# Patient Record
Sex: FEMALE | Race: WHITE
[De-identification: names, ages, dates, MRNs, and addresses within clinical notes are randomized per-mention and may not be internally consistent; named-entity substitution may affect disease eponyms.]

---

## 2020-03-08 ENCOUNTER — HOSPITAL ENCOUNTER (EMERGENCY)
Dept: HOSPITAL 41 - JD.ED | Age: 25
Discharge: HOME | End: 2020-03-08
Payer: MEDICAID

## 2020-03-08 DIAGNOSIS — Z88.5: ICD-10-CM

## 2020-03-08 DIAGNOSIS — Z88.0: ICD-10-CM

## 2020-03-08 DIAGNOSIS — Z88.8: ICD-10-CM

## 2020-03-08 DIAGNOSIS — Z91.041: ICD-10-CM

## 2020-03-08 DIAGNOSIS — J06.9: Primary | ICD-10-CM

## 2020-03-08 DIAGNOSIS — F17.210: ICD-10-CM

## 2020-03-08 DIAGNOSIS — J45.909: ICD-10-CM

## 2020-03-08 NOTE — EDM.PDOC
ED HPI GENERAL MEDICAL PROBLEM





- General


Chief Complaint: Respiratory Problem


Stated Complaint: EXPOSED TO FLU HAS COUGH AND KIDNEY PAIN


Time Seen by Provider: 03/08/20 11:29


Source of Information: Reports: Patient, Family


History Limitations: Reports: No Limitations





- History of Present Illness


INITIAL COMMENTS - FREE TEXT/NARRATIVE: 





The patient presents with a fever, cough, congestion, runny nose, and kidney 

pain.  She has a history or renal insufficiency from prior drug use.  She also 

has a sore throat.  She has no chest pain or shortness of breath.  She has a 

little nausea but no vomiting and she has no abdominal pain.  She does not have 

dysuria or hematuria.


Onset: Gradual


Duration: Day(s):


Location: Reports: Back, Other (throat)


Quality: Reports: Sharp


Severity: Moderate


Improves with: Reports: None


Worsens with: Reports: None


Associated Symptoms: Reports: Cough, Fever/Chills.  Denies: Chest Pain, 

Headaches, Nausea/Vomiting, Shortness of Breath





- Related Data


 Allergies











Allergy/AdvReac Type Severity Reaction Status Date / Time


 


butorphanol [From Stadol] Allergy  Cannot Verified 03/08/20 11:29





   Remember  


 


ketorolac [From Toradol] Allergy  Cannot Verified 03/08/20 11:29





   Remember  


 


Penicillins Allergy  Cannot Verified 03/08/20 11:29





   Remember  


 


trazodone Allergy  Cannot Verified 03/08/20 11:29





   Remember  


 


contrast dye. Allergy  Cannot Uncoded 03/08/20 11:29





   Remember  











Home Meds: 


 Home Meds





Codeine/Promethazine [Phenergan with Codeine] 5 - 10 ml PO Q6HR PRN #300 ml 03/ 08/20 [Rx]











Past Medical History


Cardiovascular History: Reports: Other (See Below)


Other Cardiovascular History: tachycardia


Respiratory History: Reports: Asthma





- Past Surgical History


HEENT Surgical History: Reports: Adenoidectomy, Myringotomy w Tube(s), 

Tonsillectomy


GI Surgical History: Reports: Cholecystectomy





Social & Family History





- Tobacco Use


Smoking Status *Q: Current Every Day Smoker


Years of Tobacco use: 12


Packs/Tins Daily: 1





- Recreational Drug Use


Recreational Drug Use: No





ED ROS GENERAL





- Review of Systems


Review Of Systems: See Below


Constitutional: Reports: No Symptoms


HEENT: Reports: Throat Pain


Respiratory: Reports: Cough.  Denies: Shortness of Breath


Cardiovascular: Reports: No Symptoms


Endocrine: Reports: No Symptoms


GI/Abdominal: Reports: Nausea.  Denies: Abdominal Pain, Vomiting


: Reports: No Symptoms


Musculoskeletal: Reports: Back Pain





ED EXAM, GENERAL





- Physical Exam


Exam: See Below


Exam Limited By: No Limitations


General Appearance: Alert, No Apparent Distress


Ears: Normal External Exam


Nose: Normal Inspection


Throat/Mouth: Other (pharyngeal erythema and some mild edema)


Head: Atraumatic, Normocephalic


Neck: Normal Inspection, Supple, Non-Tender


Respiratory/Chest: No Respiratory Distress, Lungs Clear, Normal Breath Sounds


Cardiovascular: Regular Rate, Rhythm, No Edema, No Murmur


GI/Abdominal: Soft, Non-Tender, No Organomegaly, No Mass


Back Exam: CVA Tenderness (L), CVA Tenderness (R)


Extremities: Normal Inspection


Neurological: Alert, Oriented, No Motor/Sensory Deficits





Course





- Vital Signs


Last Recorded V/S: 


 Last Vital Signs











Temp  97.1 F   03/08/20 11:26


 


Pulse  112 H  03/08/20 11:26


 


Resp  16   03/08/20 11:26


 


BP  130/102 H  03/08/20 11:26


 


Pulse Ox  98   03/08/20 11:26














- Orders/Labs/Meds


Orders: 


 Active Orders 24 hr











 Category Date Time Status


 


 CBC WITH AUTO DIFF [HEME] Stat Lab  03/08/20 12:20 Results


 


 CULTURE STREP A CONFIRMATION [RM] Stat Lab  03/08/20 11:29 Results


 


 STREP SCRN A RAPID W CULT CONF [RM] Stat Lab  03/08/20 11:29 Results











Labs: 


 Laboratory Tests











  03/08/20 03/08/20 Range/Units





  12:20 12:20 


 


WBC  3.85 L   (3.98-10.04)  K/mm3


 


RBC  4.79   (3.98-5.22)  M/mm3


 


Hgb  13.4   (11.2-15.7)  gm/dl


 


Hct  41.5   (34.1-44.9)  %


 


MCV  86.6   (79.4-94.8)  fl


 


MCH  28.0   (25.6-32.2)  pg


 


MCHC  32.3   (32.2-35.5)  g/dl


 


RDW Std Deviation  45.4   (36.4-46.3)  fL


 


Plt Count  280   (182-369)  K/mm3


 


MPV  10.0   (9.4-12.3)  fl


 


Neut % (Auto)  64.0   (34.0-71.1)  %


 


Lymph % (Auto)  17.9 L   (19.3-51.7)  %


 


Mono % (Auto)  16.6 H   (4.7-12.5)  %


 


Eos % (Auto)  1.0   (0.7-5.8)  


 


Baso % (Auto)  0.5   (0.1-1.2)  %


 


Neut # (Auto)  2.46   (1.56-6.13)  K/mm3


 


Lymph # (Auto)  0.69 L   (1.18-3.74)  K/mm3


 


Mono # (Auto)  0.64 H   (0.24-0.36)  K/mm3


 


Eos # (Auto)  0.04   (0.04-0.36)  K/mm3


 


Baso # (Auto)  0.02   (0.01-0.08)  K/mm3


 


Sodium   139  (136-145)  mEq/L


 


Potassium   3.9  (3.5-5.1)  mEq/L


 


Chloride   106  ()  mEq/L


 


Carbon Dioxide   26  (21-32)  mEq/L


 


Anion Gap   10.9  (5-15)  


 


BUN   7  (7-18)  mg/dL


 


Creatinine   0.7  (0.55-1.02)  mg/dL


 


Est Cr Clr Drug Dosing   103.40  mL/min


 


Estimated GFR (MDRD)   > 60  (>60)  mL/min


 


BUN/Creatinine Ratio   10.0 L  (14-18)  


 


Glucose   85  ()  mg/dL


 


Calcium   9.0  (8.5-10.1)  mg/dL


 


Total Bilirubin   0.2  (0.2-1.0)  mg/dL


 


AST   18  (15-37)  U/L


 


ALT   28  (14-59)  U/L


 


Alkaline Phosphatase   115  ()  U/L


 


Total Protein   7.5  (6.4-8.2)  g/dl


 


Albumin   3.7  (3.4-5.0)  g/dl


 


Globulin   3.8  gm/dL


 


Albumin/Globulin Ratio   1.0  (1-2)  














- Re-Assessments/Exams


Free Text/Narrative Re-Assessment/Exam: 





03/08/20 12:03


I have ordered some labs, influenza and strep.


03/08/20 12:54


The infleunza and strep are negative.  Her CBC and CMP look good.  She has a 

viral URI.  I will give her something for the cough.





Departure





- Departure


Time of Disposition: 13:00


Disposition: Home, Self-Care 01


Condition: Good


Clinical Impression: 


 Viral URI with cough








- Discharge Information


*PRESCRIPTION DRUG MONITORING PROGRAM REVIEWED*: Not Applicable


*COPY OF PRESCRIPTION DRUG MONITORING REPORT IN PATIENT DARIUS: Not Applicable


Prescriptions: 


Codeine/Promethazine [Phenergan with Codeine] 5 - 10 ml PO Q6HR PRN #300 ml


 PRN Reason: Cough


Referrals: 


PCP,None [Primary Care Provider] - 


Forms:  ED Department Discharge


Additional Instructions: 


Drink plenty of fluids.  Take motrin or tylenol as needed for fever.  Use the 

phenergan with codeine as needed for cough.  Please return if you are worse.





Sepsis Event Note





- Evaluation


Sepsis Screening Result: No Definite Risk





- Focused Exam


Vital Signs: 


 Vital Signs











  Temp Pulse Resp BP Pulse Ox


 


 03/08/20 11:26  97.1 F  112 H  16  130/102 H  98











Date Exam was Performed: 03/08/20


Time Exam was Performed: 12:54





- My Orders


Last 24 Hours: 


My Active Orders





03/08/20 11:29


CULTURE STREP A CONFIRMATION [RM] Stat 


STREP SCRN A RAPID W CULT CONF [RM] Stat 





03/08/20 12:20


CBC WITH AUTO DIFF [HEME] Stat 














- Assessment/Plan


Last 24 Hours: 


My Active Orders





03/08/20 11:29


CULTURE STREP A CONFIRMATION [RM] Stat 


STREP SCRN A RAPID W CULT CONF [RM] Stat 





03/08/20 12:20


CBC WITH AUTO DIFF [HEME] Stat

## 2020-03-11 ENCOUNTER — HOSPITAL ENCOUNTER (EMERGENCY)
Dept: HOSPITAL 41 - JD.ED | Age: 25
Discharge: HOME | End: 2020-03-11
Payer: SELF-PAY

## 2020-03-11 DIAGNOSIS — J45.909: ICD-10-CM

## 2020-03-11 DIAGNOSIS — Z88.0: ICD-10-CM

## 2020-03-11 DIAGNOSIS — F17.210: ICD-10-CM

## 2020-03-11 DIAGNOSIS — Z88.8: ICD-10-CM

## 2020-03-11 DIAGNOSIS — J06.9: Primary | ICD-10-CM

## 2020-03-11 DIAGNOSIS — Z91.041: ICD-10-CM

## 2020-03-11 PROCEDURE — 94640 AIRWAY INHALATION TREATMENT: CPT

## 2020-03-11 PROCEDURE — 96361 HYDRATE IV INFUSION ADD-ON: CPT

## 2020-03-11 PROCEDURE — 99283 EMERGENCY DEPT VISIT LOW MDM: CPT

## 2020-03-11 PROCEDURE — 96374 THER/PROPH/DIAG INJ IV PUSH: CPT

## 2020-03-11 RX ADMIN — KETOROLAC TROMETHAMINE ONE MG: 30 INJECTION, SOLUTION INTRAMUSCULAR at 14:11

## 2020-03-11 RX ADMIN — KETOROLAC TROMETHAMINE ONE: 30 INJECTION, SOLUTION INTRAMUSCULAR at 14:27

## 2020-03-11 NOTE — EDM.PDOC
ED HPI GENERAL MEDICAL PROBLEM





- General


Chief Complaint: Respiratory Problem


Stated Complaint: UPPER RESPIRATORY INFECTION


Time Seen by Provider: 03/11/20 13:28


Source of Information: Reports: Patient, Old Records


History Limitations: Reports: No Limitations





- History of Present Illness


INITIAL COMMENTS - FREE TEXT/NARRATIVE: 





Patient is a 24-year-old female who presents to the ED for the evaluation of 

her ongoing illness.  Patient notes she was seen in this ER a couple days ago, 

evaluated and told she had a viral illness and sent home with some cough 

medication.  She states that she was unable to afford the cough medication so 

did not fill it.  She is still coughing, and has had a little bit of nausea/

vomiting/diarrhea over the last 2 days since her last evaluation.  She notes 

she is had a chronic dry cough for the last year but does state is been worse 

the last 2 weeks.  Of note she does vape.  She states she is only had 2 or 3 

episodes of nausea and vomiting.  The diarrhea has been quite loose and has 

been about 2 or 3 episodes as well.  She states she is getting hot and cold 

flashes but no discernible fever.  Patient states she did take 2 home pregnancy 

test, and these were both negative.  She knows she has had not much for an 

appetite as well.  She is having some generalized chest tenderness due to the 

cough.


  ** Chest


Pain Score (Numeric/FACES): 9





- Related Data


 Allergies











Allergy/AdvReac Type Severity Reaction Status Date / Time


 


butorphanol [From Stadol] Allergy  Cannot Verified 03/11/20 12:17





   Remember  


 


ketorolac [From Toradol] Allergy  Cannot Verified 03/11/20 12:17





   Remember  


 


Penicillins Allergy  Cannot Verified 03/11/20 12:17





   Remember  


 


trazodone Allergy  Cannot Verified 03/11/20 12:17





   Remember  


 


contrast dye. Allergy  Cannot Uncoded 03/11/20 12:17





   Remember  











Home Meds: 


 Home Meds





Codeine/Promethazine [Phenergan with Codeine] 5 - 10 ml PO Q6HR PRN #300 ml 03/ 08/20 [Rx]











Past Medical History


Cardiovascular History: Reports: Other (See Below)


Other Cardiovascular History: tachycardia


Respiratory History: Reports: Asthma





- Past Surgical History


HEENT Surgical History: Reports: Adenoidectomy, Myringotomy w Tube(s), 

Tonsillectomy


GI Surgical History: Reports: Cholecystectomy





Social & Family History





- Tobacco Use


Smoking Status *Q: Current Every Day Smoker


Years of Tobacco use: 12


Packs/Tins Daily: 1





- Caffeine Use


Caffeine Use: Reports: None





- Recreational Drug Use


Recreational Drug Use: No





ED ROS GENERAL





- Review of Systems


Review Of Systems: See Below


Constitutional: Reports: Decreased Appetite.  Denies: Fever, Chills


HEENT: Reports: Throat Pain


Respiratory: Reports: Cough, Sputum.  Denies: Shortness of Breath


Cardiovascular: Reports: Chest Pain (chest discomfort from coughing)


GI/Abdominal: Reports: Diarrhea, Nausea, Vomiting.  Denies: Abdominal Pain


Neurological: Denies: Dizziness





ED EXAM, GENERAL





- Physical Exam


Exam: See Below


Exam Limited By: No Limitations


General Appearance: Alert, WD/WN, No Apparent Distress


Eye Exam: Bilateral Eye: EOMI, Normal Inspection, PERRL


Ears: Normal External Exam, Normal Canal, Hearing Grossly Normal, Normal TMs


Nose: Normal Inspection


Throat/Mouth: Normal Inspection, Normal Lips, Normal Teeth, Normal Gums, Normal 

Oropharynx, Normal Voice, No Airway Compromise


Head: Atraumatic, Normocephalic


Neck: Normal Inspection


Respiratory/Chest: No Respiratory Distress, Lungs Clear, Normal Breath Sounds, 

No Accessory Muscle Use, Chest Non-Tender


Cardiovascular: Normal Peripheral Pulses, Regular Rate, Rhythm, No Murmur


GI/Abdominal: Normal Bowel Sounds, Soft, Non-Tender, No Distention, No Mass


Extremities: Normal Inspection, Normal Capillary Refill


Neurological: Alert, Oriented, Normal Cognition, No Motor/Sensory Deficits


Psychiatric: Normal Affect, Normal Mood


Skin Exam: Warm, Dry, Intact, Normal Color, No Rash





Course





- Vital Signs


Last Recorded V/S: 


 Last Vital Signs











Temp  97.0 F   03/11/20 12:12


 


Pulse  124 H  03/11/20 12:12


 


Resp  20   03/11/20 12:12


 


BP  144/109 H  03/11/20 12:12


 


Pulse Ox  96   03/11/20 12:12














- Orders/Labs/Meds


Orders: 


 Active Orders 24 hr











 Category Date Time Status


 


 Peripheral IV Care [RC] .AS DIRECTED Care  03/11/20 13:43 Ordered


 


 RT Post Treatment Assessment [RC] Click to Edit Care  03/11/20 14:17 Ordered


 


 RT Pre-Treatment Assessment [RC] Click to Edit Care  03/11/20 14:17 Ordered


 


 Sodium Chloride 0.9% [Saline Flush] Med  03/11/20 13:43 Active





 10 ml FLUSH ASDIRECTED PRN   


 


 Peripheral IV Insertion Adult [OM.PC] Stat Oth  03/11/20 13:43 Ordered








 Medication Orders





Sodium Chloride (Saline Flush)  10 ml FLUSH ASDIRECTED PRN


   PRN Reason: Keep Vein Open








Meds: 


Medications











Generic Name Dose Route Start Last Admin





  Trade Name Freq  PRN Reason Stop Dose Admin


 


Sodium Chloride  10 ml  03/11/20 13:43  





  Saline Flush  FLUSH   





  ASDIRECTED PRN   





  Keep Vein Open   





     





     





     














Discontinued Medications














Generic Name Dose Route Start Last Admin





  Trade Name Freq  PRN Reason Stop Dose Admin


 


Acetaminophen  650 mg  03/11/20 14:49  





  Tylenol  PO  03/11/20 14:50  





  NOW ONE   





     





     





     





     


 


Albuterol  0 gm  03/11/20 14:17  03/11/20 15:08





  Proventil Hfa  INH  03/11/20 14:18  2 puff





  ONETIME ONE   Administration





     





     





     





     


 


Sodium Chloride  1,000 mls @ 999 mls/hr  03/11/20 13:43  03/11/20 14:11





  Normal Saline  IV  03/11/20 14:43  999 mls/hr





  ONETIME ONE   Administration





     





     





     





     


 


Ketorolac Tromethamine  30 mg  03/11/20 13:43  03/11/20 14:27





  Toradol  IVPUSH  03/11/20 13:44  Not Given





  ONETIME ONE   





     





     





     





     


 


Ondansetron HCl  4 mg  03/11/20 13:43  03/11/20 14:11





  Zofran  IVPUSH  03/11/20 13:44  4 mg





  ONETIME ONE   Administration





     





     





     





     


 


Promethazine HCl/Codeine  5 ml  03/11/20 13:42  03/11/20 14:11





  Phenergan With Codeine  PO  03/11/20 13:43  5 ml





  ONETIME ONE   Administration





     





     





     





     














- Re-Assessments/Exams


Free Text/Narrative Re-Assessment/Exam: 





03/11/20 13:47


Patient presents to the ED for evaluation of her worsening upper respiratory 

symptoms.  On examination, the patient looks fine, but is complaining of nausea 

and vomiting and not able to keep any food down.  She will be given 4 mg IV 

Zofran, 30 mg IV Toradol, she states she cannot remember what happened with 

Toradol the last time she took it.  This is likely not a true allergy.  Should 

be given some promethazine and codeine for cough syrup today.  Unfortunately 

patient states she is very financially strapped and cannot afford much for 

medications, so did not fill the cough syrup that was provided to her a few 

days ago.  She did have an extensive laboratory evaluation done that visit, and 

everything was negative, due to the patient's presentation today I do not 

believe further laboratory evaluation is warranted.





03/11/20 15:08


Patient's fluids are almost done, she did receive some Tylenol for chest pain, 

she refused the Toradol.  I have provided her with an outpatient albuterol 

inhaler for the ER, as she cannot afford other medications through pharmacies.  

We will have her take this as directed and follow-up with her primary care 

provider of choice in a few days if she is not feeling much better.





Departure





- Departure


Time of Disposition: 15:09


Disposition: Home, Self-Care 01


Condition: Fair


Clinical Impression: 


 Viral URI with cough








- Discharge Information


*PRESCRIPTION DRUG MONITORING PROGRAM REVIEWED*: No


*COPY OF PRESCRIPTION DRUG MONITORING REPORT IN PATIENT DARIUS: No


Instructions:  Viral Respiratory Infection, Easy-To-Read


Referrals: 


PCP,Not In Area [Primary Care Provider] - 


Forms:  ED Department Discharge


Additional Instructions: 


You have been evaluated in the ED today for your cold like symptoms, cough, 

sore throat.





This is likely a viral illness in etiology.





Please increase your fluid intake. Get plenty of rest as well. You should feel 

better in a few days.





You were provided with an albuterol inhaler, please take 1 to 2 puffs every 4 

hours as needed for further cough/increasing shortness of breath.





Recommend that you take some over-the-counter nasal decongestants, cough/cold 

remedies to combat this.  Medicines like NyQuil, DayQuil, phenylephrine and 

other sinus decongestants are adequate.





If your symptoms are not better in one week's time recommend that you follow up 

in a clinic or your primary care provider.  Our [facility] clinic number is [phone], the [place] clinic is [phone].  Any family practice provider would 

be able to provide you with the services.





Please return to the ED if your symptoms change or worsen.





Sepsis Event Note





- Evaluation


Sepsis Screening Result: No Definite Risk





- Focused Exam


Vital Signs: 


 Vital Signs











  Temp Pulse Resp BP Pulse Ox


 


 03/11/20 12:12  97.0 F  124 H  20  144/109 H  96











Date Exam was Performed: 03/11/20


Time Exam was Performed: 15:08





- My Orders


Last 24 Hours: 


My Active Orders





03/11/20 13:43


Peripheral IV Care [RC] .AS DIRECTED 


Sodium Chloride 0.9% [Saline Flush]   10 ml FLUSH ASDIRECTED PRN 


Peripheral IV Insertion Adult [OM.PC] Stat 





03/11/20 14:17


RT Post Treatment Assessment [RC] Click to Edit 


RT Pre-Treatment Assessment [RC] Click to Edit 














- Assessment/Plan


Last 24 Hours: 


My Active Orders





03/11/20 13:43


Peripheral IV Care [RC] .AS DIRECTED 


Sodium Chloride 0.9% [Saline Flush]   10 ml FLUSH ASDIRECTED PRN 


Peripheral IV Insertion Adult [OM.PC] Stat 





03/11/20 14:17


RT Post Treatment Assessment [RC] Click to Edit 


RT Pre-Treatment Assessment [RC] Click to Edit

## 2020-03-14 ENCOUNTER — HOSPITAL ENCOUNTER (EMERGENCY)
Dept: HOSPITAL 41 - JD.ED | Age: 25
Discharge: HOME | End: 2020-03-14
Payer: MEDICAID

## 2020-03-14 DIAGNOSIS — Z88.0: ICD-10-CM

## 2020-03-14 DIAGNOSIS — Z90.49: ICD-10-CM

## 2020-03-14 DIAGNOSIS — R11.0: ICD-10-CM

## 2020-03-14 DIAGNOSIS — Z88.8: ICD-10-CM

## 2020-03-14 DIAGNOSIS — F17.210: ICD-10-CM

## 2020-03-14 DIAGNOSIS — J45.909: ICD-10-CM

## 2020-03-14 DIAGNOSIS — R51: Primary | ICD-10-CM

## 2020-03-14 DIAGNOSIS — N18.2: ICD-10-CM

## 2020-03-14 DIAGNOSIS — Z91.041: ICD-10-CM

## 2020-03-14 PROCEDURE — 96374 THER/PROPH/DIAG INJ IV PUSH: CPT

## 2020-03-14 PROCEDURE — 96361 HYDRATE IV INFUSION ADD-ON: CPT

## 2020-03-14 PROCEDURE — 87804 INFLUENZA ASSAY W/OPTIC: CPT

## 2020-03-14 PROCEDURE — 96375 TX/PRO/DX INJ NEW DRUG ADDON: CPT

## 2020-03-14 PROCEDURE — 99284 EMERGENCY DEPT VISIT MOD MDM: CPT

## 2020-03-14 NOTE — EDM.PDOC
ED HPI GENERAL MEDICAL PROBLEM





- General


Chief Complaint: Fever


Stated Complaint: HEADACHE


Time Seen by Provider: 03/14/20 17:54


Source of Information: Reports: Patient


History Limitations: Reports: No Limitations





- History of Present Illness


INITIAL COMMENTS - FREE TEXT/NARRATIVE: 





Patient is a 24-year-old female who presents with complaints of nausea that 

started this morning as well as a headache that started approximately 3 hours 

prior to arrival.  Patient has been sick with a cough for the last 3 weeks, 

however she states that the cough has mostly resolved.  She has had no vomiting 

or diarrhea.  She feels like she was warm this morning, however did not check 

her temperature at home.  She has been taking Tylenol at home for the headache.

  She denies any photosensitivity, however does have some phonosensitivity. She 

has a history of migraines which she state were mostly during her pregnancy.  

She does have a occasional headache still, however generally are not this bad.  

She denies any sinus congestion at this time.  Denies any pain in her ears.


  ** Headache


Pain Score (Numeric/FACES): 10





- Related Data


 Allergies











Allergy/AdvReac Type Severity Reaction Status Date / Time


 


butorphanol [From Stadol] Allergy  Cannot Verified 03/11/20 12:17





   Remember  


 


ketorolac [From Toradol] Allergy  Cannot Verified 03/11/20 12:17





   Remember  


 


Penicillins Allergy  Cannot Verified 03/11/20 12:17





   Remember  


 


trazodone Allergy  Cannot Verified 03/11/20 12:17





   Remember  


 


contrast dye. Allergy  Cannot Uncoded 03/11/20 12:17





   Remember  











Home Meds: 


 Home Meds





Pantoprazole Sodium [Protonix] 20 mg PO BID 03/14/20 [History]











Past Medical History


Cardiovascular History: Reports: Other (See Below)


Other Cardiovascular History: tachycardia


Respiratory History: Reports: Asthma


Genitourinary History: Reports: Other (See Below)


Other Genitourinary History: stage 2 kidney disease





- Past Surgical History


HEENT Surgical History: Reports: Adenoidectomy, Myringotomy w Tube(s), 

Tonsillectomy


GI Surgical History: Reports: Cholecystectomy





Social & Family History





- Family History


Family Medical History: Noncontributory





- Tobacco Use


Smoking Status *Q: Current Every Day Smoker


Years of Tobacco use: 12


Packs/Tins Daily: 0.5





- Caffeine Use


Caffeine Use: Reports: None





ED ROS GENERAL





- Review of Systems


Review Of Systems: Comprehensive ROS is negative, except as noted in HPI.





- Physical Exam


Exam: See Below


Exam Limited By: No Limitations


General Appearance: Alert, WD/WN, No Apparent Distress


Eye Exam: Bilateral Eye: Normal Inspection


Ears: Normal External Exam, Normal Canal, Hearing Grossly Normal, Normal TMs


Throat/Mouth: Normal Inspection, Normal Lips, Normal Teeth, Normal Gums, Normal 

Oropharynx, Normal Voice, No Airway Compromise


Respiratory/Chest: No Respiratory Distress, Lungs Clear, Normal Breath Sounds, 

No Accessory Muscle Use, Chest Non-Tender


Cardiovascular: Normal Peripheral Pulses, Regular Rate, Rhythm, No Edema, No 

Gallop, No JVD, No Murmur, No Rub


Neuro Exam (Abbreviated): Alert, Oriented, CN II-XII Intact, Normal Cognition, 

Normal Gait, Normal Reflexes, No Motor/Sensory Deficits


Psychiatric: Normal Affect, Normal Mood


Skin Exam: Warm, Dry, Intact, Normal Color, No Rash





Course





- Vital Signs


Last Recorded V/S: 


 Last Vital Signs











Temp  97.8 F   03/14/20 17:19


 


Pulse  107 H  03/14/20 17:19


 


Resp  16   03/14/20 17:19


 


BP  126/98 H  03/14/20 17:19


 


Pulse Ox  100   03/14/20 17:19














- Orders/Labs/Meds


Orders: 


 Active Orders 24 hr











 Category Date Time Status


 


 Isolation [COMM] Routine Oth  03/14/20 17:32 Ordered











Meds: 


Medications














Discontinued Medications














Generic Name Dose Route Start Last Admin





  Trade Name Freq  PRN Reason Stop Dose Admin


 


Hydromorphone HCl  0.5 mg  03/14/20 18:19  03/14/20 18:53





  Dilaudid  IVPUSH  03/14/20 18:20  0.5 mg





  ONETIME ONE   Administration





     





     





     





     


 


Sodium Chloride  1,000 mls @ 999 mls/hr  03/14/20 18:30  03/14/20 18:53





  Normal Saline  IV   999 mls/hr





  ASDIRECTED VIC   Administration





     





     





     





     


 


Ondansetron HCl  4 mg  03/14/20 18:19  03/14/20 18:53





  Zofran  IVPUSH  03/14/20 18:20  4 mg





  ONETIME ONE   Administration





     





     





     





     














- Re-Assessments/Exams


Free Text/Narrative Re-Assessment/Exam: 





03/14/20 18:35


Patient's overall exam was negative.  She is allergic to Toradol, therefore we 

will treat with a liter of IV fluids, Zofran, and Dilaudid IV.  She does have a 

history of migraines and this headache was of gradual onset.  Discussed the 

option of a CT of the head, and patient declined at this time.


03/14/20 19:38


Patient verbalized relief from her headache and nausea she feels like she is 

ready to go home.  We will let her finish her liter of IV fluids and then 

proceed with discharge.  Discharge instructions as documented.





Departure





- Departure


Time of Disposition: 19:38


Disposition: Home, Self-Care 01


Condition: Fair


Clinical Impression: 


Headache


Qualifiers:


 Headache type: unspecified Headache chronicity pattern: acute headache 

Intractability: not intractable Qualified Code(s): R51 - Headache








- Discharge Information


Instructions:  Migraine Headache, Easy-to-Read


Referrals: 


PCP,None [Primary Care Provider] - 


Forms:  ED Department Discharge


Additional Instructions: 


You were seen in the emergency department today for nausea and a headache.  

While in the ER you received a liter of IV fluids, Dilaudid, and Zofran.  He 

did verbalize that these relieved her symptoms.  Recommend that you go home and 

rest in a quiet dark room for the rest of the evening.  You may use over-the-

counter Tylenol or ibuprofen as needed for any additional headache.  If you 

should experience any new or worsening symptoms of concern, please do not 

hesitate to return to the emergency department.





Sepsis Event Note





- Evaluation


Sepsis Screening Result: No Definite Risk





- Focused Exam


Vital Signs: 


 Vital Signs











  Temp Pulse Resp BP Pulse Ox


 


 03/14/20 17:19  97.8 F  107 H  16  126/98 H  100











Date Exam was Performed: 03/15/20


Time Exam was Performed: 00:07





- My Orders


Last 24 Hours: 


My Active Orders





03/14/20 17:32


Isolation [COMM] Routine 














- Assessment/Plan


Last 24 Hours: 


My Active Orders





03/14/20 17:32


Isolation [COMM] Routine

## 2020-04-26 ENCOUNTER — HOSPITAL ENCOUNTER (EMERGENCY)
Dept: HOSPITAL 41 - JD.ED | Age: 25
Discharge: HOME | End: 2020-04-26
Payer: MEDICAID

## 2020-04-26 DIAGNOSIS — Z20.828: ICD-10-CM

## 2020-04-26 DIAGNOSIS — R68.83: ICD-10-CM

## 2020-04-26 DIAGNOSIS — R05: Primary | ICD-10-CM

## 2020-04-26 DIAGNOSIS — Z88.0: ICD-10-CM

## 2020-04-26 DIAGNOSIS — Z88.8: ICD-10-CM

## 2020-04-26 DIAGNOSIS — F41.9: ICD-10-CM

## 2020-04-26 DIAGNOSIS — Z79.899: ICD-10-CM

## 2020-04-26 DIAGNOSIS — F17.210: ICD-10-CM

## 2020-04-26 DIAGNOSIS — Z91.041: ICD-10-CM

## 2020-04-26 DIAGNOSIS — Z88.6: ICD-10-CM

## 2020-04-26 DIAGNOSIS — F31.9: ICD-10-CM

## 2020-04-26 DIAGNOSIS — N18.2: ICD-10-CM

## 2020-04-26 DIAGNOSIS — J45.909: ICD-10-CM

## 2020-04-26 PROCEDURE — U0002 COVID-19 LAB TEST NON-CDC: HCPCS

## 2020-04-26 NOTE — CR
Chest: Portable view of the chest was obtained.

 

Comparison: No prior chest imaging.

 

Heart size and mediastinum are normal.  Lungs are clear with no acute 

parenchymal change.  Minimal scoliosis is noted.

 

Impression:

1.  Nothing acute is appreciated on portable chest x-ray.

 

Diagnostic code #1

 

This report was dictated in MDT

## 2020-04-26 NOTE — EDM.PDOC
ED HPI GENERAL MEDICAL PROBLEM





- General


Chief Complaint: Respiratory Problem


Stated Complaint: COUGH/CHILLS


Time Seen by Provider: 04/26/20 16:46


Source of Information: Reports: Patient, Old Records, RN Notes Reviewed


History Limitations: Reports: No Limitations





- History of Present Illness


INITIAL COMMENTS - FREE TEXT/NARRATIVE: 





Patient is a 24-year-old female who presents to the ED for ongoing cough and 

chills.  Patient notes she has had a cough for roughly 3 weeks, she moved here 

from [place] around that time.  She, tested for influenza and strep was seen 3 

times in March for upper respiratory illness, and was sent home with some cough 

medication.  Patient states that she was tested for COVID-19, with negative 

results.  I cannot see that in our computer anywhere, but the patient states 

this was done in this ER.  Patient notes she does work at [employer], and went to 

work the other day, and was sent home due to a mild fever and cough.  She 

states that her work will not let her return to work until she is tested and is 

negative, or if symptoms stop for at least 7 days.  Patient is resting calmly 

on the ER cot talking in full sentences, her O2 sats are 100% on room air, and 

all other vital signs are stable, she is mildly tacky, but the patient states 

that she has a prior issue with this.  Patient is not complaining of any nausea/

vomiting/diarrhea.  States she is having some shortness of breath that is there 

pretty well all the time, a cough that sometimes productive, and when she coughs

, makes the shortness of breath worse.  She does have a history of asthma, but 

does not think that this is necessarily worse since this illness.





- Related Data


 Allergies











Allergy/AdvReac Type Severity Reaction Status Date / Time


 


butorphanol [From Stadol] Allergy Severe Cannot Verified 04/26/20 16:44





   Remember  


 


ketorolac [From Toradol] Allergy Severe Cannot Verified 04/26/20 16:44





   Remember  


 


Penicillins Allergy Severe Cannot Verified 04/26/20 16:44





   Remember  


 


trazodone Allergy Severe Cannot Verified 04/26/20 16:44





   Remember  


 


contrast dye. Allergy Severe Cannot Uncoded 04/26/20 16:44





   Remember  











Home Meds: 


 Home Meds





Divalproex Sodium [Depakote ER] 250 mg PO TID 04/26/20 [History]


Famotidine 40 mg PO DAILY 04/26/20 [History]











Past Medical History


Cardiovascular History: Reports: Other (See Below)


Other Cardiovascular History: tachycardia


Respiratory History: Reports: Asthma


Genitourinary History: Reports: Other (See Below)


Other Genitourinary History: stage 2 kidney disease


Psychiatric History: Reports: Anxiety, Bipolar





- Past Surgical History


HEENT Surgical History: Reports: Adenoidectomy, Myringotomy w Tube(s), 

Tonsillectomy


GI Surgical History: Reports: Cholecystectomy





Social & Family History





- Family History


Family Medical History: Noncontributory





- Tobacco Use


Smoking Status *Q: Current Every Day Smoker


Years of Tobacco use: 11


Packs/Tins Daily: 1.5





- Caffeine Use


Caffeine Use: Reports: Coffee, Energy Drinks, Tea





- Recreational Drug Use


Recreational Drug Use: Yes


Drug Use in Last 12 Months: No


Recreational Drug Type: Reports: Marijuana/Hashish





ED ROS GENERAL





- Review of Systems


Review Of Systems: See Below


Constitutional: Reports: Chills.  Denies: Fever


Respiratory: Reports: Shortness of Breath, Cough


Cardiovascular: Denies: Chest Pain


GI/Abdominal: Denies: Abdominal Pain, Constipation, Diarrhea, Nausea, Vomiting





ED EXAM, GENERAL





- Physical Exam


Exam: See Below


Exam Limited By: No Limitations


General Appearance: Alert, WD/WN, No Apparent Distress


Throat/Mouth: Normal Inspection, Normal Lips, Normal Teeth, Normal Gums, Normal 

Oropharynx, Normal Voice, No Airway Compromise


Head: Atraumatic, Normocephalic


Neck: Normal Inspection


Respiratory/Chest: No Respiratory Distress, Lungs Clear, Normal Breath Sounds, 

No Accessory Muscle Use, Chest Non-Tender


Cardiovascular: Normal Peripheral Pulses, Regular Rate, Rhythm, No Murmur


Extremities: Normal Inspection, Normal Capillary Refill


Neurological: Alert, Oriented, Normal Cognition, No Motor/Sensory Deficits


Psychiatric: Normal Affect, Normal Mood


Skin Exam: Warm, Dry, Intact, Normal Color, No Rash





Course





- Vital Signs


Last Recorded V/S: 


 Last Vital Signs











Temp  98.9 F   04/26/20 16:36


 


Pulse  117 H  04/26/20 16:36


 


Resp  18   04/26/20 16:36


 


BP  127/105 H  04/26/20 16:36


 


Pulse Ox  100   04/26/20 16:36














- Orders/Labs/Meds


Orders: 


 Active Orders 24 hr











 Category Date Time Status


 


 CORONAVIRUS COVID-19 PCR [facility]  04/26/20 17:04 Ordered











Labs: 


 Laboratory Tests











  04/26/20 04/26/20 04/26/20 Range/Units





  17:40 17:40 17:40 


 


WBC   4.65   (3.98-10.04)  K/mm3


 


RBC   5.21   (3.98-5.22)  M/mm3


 


Hgb   14.3   (11.2-15.7)  gm/dl


 


Hct   43.2   (34.1-44.9)  %


 


MCV   82.9  D   (79.4-94.8)  fl


 


MCH   27.4   (25.6-32.2)  pg


 


MCHC   33.1   (32.2-35.5)  g/dl


 


RDW Std Deviation   42.4   (36.4-46.3)  fL


 


Plt Count   343   (182-369)  K/mm3


 


MPV   9.9   (9.4-12.3)  fl


 


Neutrophils % (Manual)   68 H   (40-60)  %


 


Band Neutrophils %   0   (0-10)  %


 


Lymphocytes % (Manual)   26   (20-40)  %


 


Atypical Lymphs %   0   %


 


Monocytes % (Manual)   6   (2-10)  %


 


Eosinophils % (Manual)   0 L   (0.7-5.8)  %


 


Basophils % (Manual)   0 L   (0.1-1.2)  


 


Platelet Estimate   Adequate   


 


Plt Morphology Comment   Normal   


 


RBC Morph Comment   Normal   


 


PT    11.2  (9.7-12.0)  SECONDS


 


INR    1.03  


 


APTT    28  (22-31)  SECONDS


 


Sodium     (136-145)  mEq/L


 


Potassium     (3.5-5.1)  mEq/L


 


Chloride     ()  mEq/L


 


Carbon Dioxide     (21-32)  mEq/L


 


Anion Gap     (5-15)  


 


BUN     (7-18)  mg/dL


 


Creatinine     (0.55-1.02)  mg/dL


 


Est Cr Clr Drug Dosing     mL/min


 


Estimated GFR (MDRD)     (>60)  mL/min


 


BUN/Creatinine Ratio     (14-18)  


 


Glucose     ()  mg/dL


 


Calcium     (8.5-10.1)  mg/dL


 


Magnesium     (1.8-2.4)  mg/dl


 


Ferritin     (8-252)  ng/ml


 


Total Bilirubin     (0.2-1.0)  mg/dL


 


AST     (15-37)  U/L


 


ALT     (14-59)  U/L


 


Alkaline Phosphatase     ()  U/L


 


Lactate Dehydrogenase     ()  U/L


 


C-Reactive Protein  0.7    (<1.0)  mg/dL


 


Total Protein     (6.4-8.2)  g/dl


 


Albumin     (3.4-5.0)  g/dl


 


Globulin     gm/dL


 


Albumin/Globulin Ratio     (1-2)  














  04/26/20 04/26/20 Range/Units





  17:40 17:40 


 


WBC    (3.98-10.04)  K/mm3


 


RBC    (3.98-5.22)  M/mm3


 


Hgb    (11.2-15.7)  gm/dl


 


Hct    (34.1-44.9)  %


 


MCV    (79.4-94.8)  fl


 


MCH    (25.6-32.2)  pg


 


MCHC    (32.2-35.5)  g/dl


 


RDW Std Deviation    (36.4-46.3)  fL


 


Plt Count    (182-369)  K/mm3


 


MPV    (9.4-12.3)  fl


 


Neutrophils % (Manual)    (40-60)  %


 


Band Neutrophils %    (0-10)  %


 


Lymphocytes % (Manual)    (20-40)  %


 


Atypical Lymphs %    %


 


Monocytes % (Manual)    (2-10)  %


 


Eosinophils % (Manual)    (0.7-5.8)  %


 


Basophils % (Manual)    (0.1-1.2)  


 


Platelet Estimate    


 


Plt Morphology Comment    


 


RBC Morph Comment    


 


PT    (9.7-12.0)  SECONDS


 


INR    


 


APTT    (22-31)  SECONDS


 


Sodium  141   (136-145)  mEq/L


 


Potassium  3.8   (3.5-5.1)  mEq/L


 


Chloride  105   ()  mEq/L


 


Carbon Dioxide  24   (21-32)  mEq/L


 


Anion Gap  15.8 H   (5-15)  


 


BUN  8   (7-18)  mg/dL


 


Creatinine  0.8   (0.55-1.02)  mg/dL


 


Est Cr Clr Drug Dosing  88.50   mL/min


 


Estimated GFR (MDRD)  > 60   (>60)  mL/min


 


BUN/Creatinine Ratio  10.0 L   (14-18)  


 


Glucose  92   ()  mg/dL


 


Calcium  9.2   (8.5-10.1)  mg/dL


 


Magnesium  2.0   (1.8-2.4)  mg/dl


 


Ferritin   26  (8-252)  ng/ml


 


Total Bilirubin  0.5   (0.2-1.0)  mg/dL


 


AST  16   (15-37)  U/L


 


ALT  23   (14-59)  U/L


 


Alkaline Phosphatase  93   ()  U/L


 


Lactate Dehydrogenase  149   ()  U/L


 


C-Reactive Protein    (<1.0)  mg/dL


 


Total Protein  7.8   (6.4-8.2)  g/dl


 


Albumin  4.1   (3.4-5.0)  g/dl


 


Globulin  3.7   gm/dL


 


Albumin/Globulin Ratio  1.1   (1-2)  














- Re-Assessments/Exams


Free Text/Narrative Re-Assessment/Exam: 





04/26/20 17:10


The patient presents to the ED for evaluation of her ongoing cough and chills.  

Again I looked at her old records, I do not see anywhere where she had been 

tested for COVID-19 out of this ER, and her last 3 visits in March.  She was 

tested for influenza twice and strep once and all were negative.  Nonetheless I 

have ordered some labs, COVID-19 screen, and a chest x-ray for today's purposes.





04/26/20 17:49


Chest x-ray has been taken, and does appear to be within normal limits, no 

acute abnormalities appreciated by radiologist.





04/26/20 18:35


Labs are back, and demonstrate no worrisome abnormalities.  Patient be 

discharged home with general recommendations.





Departure





- Departure


Time of Disposition: 18:33


Disposition: Home, Self-Care 01


Condition: Good


Clinical Impression: 


 Cough present for greater than 3 weeks, Chills (without fever)








- Discharge Information


*PRESCRIPTION DRUG MONITORING PROGRAM REVIEWED*: No


*COPY OF PRESCRIPTION DRUG MONITORING REPORT IN PATIENT DARIUS: No


Instructions:  Cough, Adult, Easy-to-Read


Referrals: 


PCP,None [Primary Care Provider] - 


Forms:  ED Department Discharge


Additional Instructions: 


You were seen in the ER today for ongoing respiratory symptoms.





Your chest x-ray showed no signs of pneumonia at this time.  Your oxygen levels 

were great at % on room air. 





At this time we did test you for coronavirus. You have been given a handout 

from the [place] Dept[facility] of Health regarding this. We ask that you self-quarantine for 

at least 7 days from the beginning of symptoms. You have been given a work note 

to reflect this.  Swabs are sent from this facility on a daily basis, at 2:30 PM

, you should expect up to 3-5 business days for positive or negative results. 

However you may receive results earlier than this. We are doing our best to 

call as soon as we get results from the [place] dept. of Health.





It is recommended at this time that you go home and self quarantine and try to 

limit exposure to other as much as possible.





Please try to increase your oral fluid intake, and eat multiple small meals 

throughout the day, to keep yourself healthy.





You may take 500 mg Tylenol every hours 6 hours for pain/fever relief.  Do not 

exceed 4000 mg Tylenol in a 24-hour time span. However, running a fever is your 

body's natural response to illness, and it allows the body to develop 

antibodies to disease, we are recommending trying to limit the use of Tylenol 

as much as possible to allow your body's natural immune response.











Sepsis Event Note





- Evaluation


Sepsis Screening Result: No Definite Risk





- Focused Exam


Vital Signs: 


 Vital Signs











  Temp Pulse Resp BP Pulse Ox


 


 04/26/20 16:36  98.9 F  117 H  18  127/105 H  100











Date Exam was Performed: 04/26/20


Time Exam was Performed: 18:35





- My Orders


Last 24 Hours: 


My Active Orders





04/26/20 17:04


CORONAVIRUS COVID-19 PCR [facility] Stat 














- Assessment/Plan


Last 24 Hours: 


My Active Orders





04/26/20 17:04


CORONAVIRUS COVID-19 PCR [facility] Stat

## 2020-04-28 ENCOUNTER — HOSPITAL ENCOUNTER (EMERGENCY)
Dept: HOSPITAL 41 - JD.ED | Age: 25
Discharge: HOME | End: 2020-04-28
Payer: MEDICAID

## 2020-04-28 DIAGNOSIS — Z91.041: ICD-10-CM

## 2020-04-28 DIAGNOSIS — Z88.0: ICD-10-CM

## 2020-04-28 DIAGNOSIS — Z88.8: ICD-10-CM

## 2020-04-28 DIAGNOSIS — F17.210: ICD-10-CM

## 2020-04-28 DIAGNOSIS — Z88.6: ICD-10-CM

## 2020-04-28 DIAGNOSIS — Z79.899: ICD-10-CM

## 2020-04-28 DIAGNOSIS — R00.0: ICD-10-CM

## 2020-04-28 DIAGNOSIS — Z90.49: ICD-10-CM

## 2020-04-28 DIAGNOSIS — Z71.1: Primary | ICD-10-CM
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
## 2020-05-22 ENCOUNTER — HOSPITAL ENCOUNTER (EMERGENCY)
Dept: HOSPITAL 41 - JD.ED | Age: 25
Discharge: HOME | End: 2020-05-22
Payer: MEDICAID

## 2020-05-22 DIAGNOSIS — Z91.041: ICD-10-CM

## 2020-05-22 DIAGNOSIS — S16.1XXA: Primary | ICD-10-CM

## 2020-05-22 DIAGNOSIS — Z88.6: ICD-10-CM

## 2020-05-22 DIAGNOSIS — Z88.0: ICD-10-CM

## 2020-05-22 DIAGNOSIS — Z88.8: ICD-10-CM

## 2020-05-22 DIAGNOSIS — J45.909: ICD-10-CM
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
## 2020-05-22 DIAGNOSIS — F17.210: ICD-10-CM

## 2020-05-22 DIAGNOSIS — K21.9: ICD-10-CM

## 2020-05-22 PROCEDURE — 99283 EMERGENCY DEPT VISIT LOW MDM: CPT

## 2020-05-22 PROCEDURE — 72040 X-RAY EXAM NECK SPINE 2-3 VW: CPT
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
## 2020-05-24 NOTE — CR
Cervical spine: AP, lateral and odontoid views of the cervical spine 

were obtained.

 

Comparison: No previous study.

 

Vertebral body heights and disc spaces are maintained.  Prevertebral 

soft tissues are normal.  No subluxation or fracture is appreciated.

 

Impression:

1.  No abnormality is identified on 3 view cervical spine exam.

 

Diagnostic code #1

 

This report was dictated in MDT

## 2020-06-17 ENCOUNTER — HOSPITAL ENCOUNTER (EMERGENCY)
Dept: HOSPITAL 41 - JD.ED | Age: 25
Discharge: HOME | End: 2020-06-17
Payer: MEDICAID

## 2020-06-17 DIAGNOSIS — N39.0: Primary | ICD-10-CM

## 2020-06-17 PROCEDURE — 81025 URINE PREGNANCY TEST: CPT

## 2020-06-17 PROCEDURE — 87186 SC STD MICRODIL/AGAR DIL: CPT

## 2020-06-17 PROCEDURE — 80048 BASIC METABOLIC PNL TOTAL CA: CPT

## 2020-06-17 PROCEDURE — 99283 EMERGENCY DEPT VISIT LOW MDM: CPT

## 2020-06-17 PROCEDURE — 87086 URINE CULTURE/COLONY COUNT: CPT

## 2020-06-17 PROCEDURE — 81001 URINALYSIS AUTO W/SCOPE: CPT

## 2020-06-17 PROCEDURE — 87088 URINE BACTERIA CULTURE: CPT

## 2020-06-17 PROCEDURE — 85025 COMPLETE CBC W/AUTO DIFF WBC: CPT

## 2020-06-17 PROCEDURE — 36415 COLL VENOUS BLD VENIPUNCTURE: CPT

## 2020-06-17 NOTE — ER
CHIEF COMPLAINT:

Burning and frequency with urination.

 

HISTORY OF PRESENT ILLNESS:

The patient is a 24-year-old female who presents to the emergency room with

worsening burning and frequency with urination.  The patient has had recurrent

bladder infections in the past.  She also has a history of stage 2 renal

disease.  The cause of this is unclear and the patient is not certain as to why.

The patient denies any fever or chills.  She has had no nausea, vomiting, or

diarrhea.  However, she has had some worsening back discomfort with this.  Pain

is mostly in her lower abdomen, right side worse than left.

 

REVIEW OF SYSTEMS:

GENERAL:  Negative for fever, chills, or dizziness.

HEENT:  Unremarkable.

CARDIOVASCULAR:  The patient denies any chest pain or chest pressure or

developing edema.

PULMONARY:  No breathing difficulties or shortness of breath.

GASTROINTESTINAL:  Significant for lower abdominal discomfort.  No nausea,

vomiting, constipation, or diarrhea.

GENITOURINARY:  Urinary frequency.  No hematuria.  She has a quite bit of

discomfort especially at the end of voiding.

MUSCULOSKELETAL:  The patient has no aches and pains that are out of the

ordinary for her.

 

PHYSICAL EXAMINATION:

GENERAL:  The patient appears her stated age.  She is in no acute distress.  She

is afebrile.

HEAD:  Normocephalic.

NECK:  Supple.  No palpable neck masses.  Thyroid is not palpable.  No

lymphadenopathy.

HEART:  Regular rate and rhythm.  No murmur.

LUNGS:  Clear.  Respirations nonlabored.  No wheezes, crackles, or rhonchi.

ABDOMEN:  Active bowel sounds.  Soft.  No rebound or guarding.  She has

significant discomfort in the suprapubic area extending into the right lower

quadrant and the left lower quadrant.

BACK:  She has some low back discomfort but no classic CVA discomfort.

EXTREMITIES:  Negative for varicosities or edema.

NEUROLOGIC:  She is awake, alert, normal thinking process.

 

COURSE IN THE EMERGENCY ROOM:

The patient had some labs obtained.  Her BUN is 9 with creatinine of 0.88.

Sodium 142, potassium is little low at 3.2, chloride 107, CO2 of 26.3, glucose

84.  Urinalysis somewhat suggestive of UTI with 1+ leukocyte esterase.  Nitrites

are negative.  Microscopic shows 0 to 5 rbc's, 5 to 10 wbc's, few epithelials,

few bacteria.  Urine is set up for culture.  HCG is negative.  CBC shows a white

count of 7830, hemoglobin and hematocrit of 13.9 and 41.6% respectively with

platelet count of 329,000.  The patient is given 40 mEq of potassium.  She was

started on Macrobid 100 mg b.i.d. one in the emergency room and a prescription

for #14, Pyridium 100 mg one in the emergency room and prescription for #6, one

to be taken every 8 hours.

 

FINAL DIAGNOSIS:

Urinary tract infection.

 

PLAN:

The patient as stated above will be started on Macrobid and Pyridium and advised

to push a lots of fluids.  The patient should follow up in the hospital clinic

in 10 to 12 days for recheck.  She is advised to return to the emergency room

with any questions or problems.

 

DD:  06/17/2020 06:51:59

DT:  06/17/2020 13:47:15  DUANE

Job #:  [id number]

## 2020-06-22 ENCOUNTER — HOSPITAL ENCOUNTER (EMERGENCY)
Dept: HOSPITAL 41 - JD.ED | Age: 25
Discharge: HOME | End: 2020-06-22
Payer: MEDICAID

## 2020-06-22 DIAGNOSIS — Z79.899: ICD-10-CM

## 2020-06-22 DIAGNOSIS — Z88.5: ICD-10-CM

## 2020-06-22 DIAGNOSIS — J45.909: ICD-10-CM

## 2020-06-22 DIAGNOSIS — Z88.2: ICD-10-CM

## 2020-06-22 DIAGNOSIS — Z88.0: ICD-10-CM

## 2020-06-22 DIAGNOSIS — F41.9: ICD-10-CM

## 2020-06-22 DIAGNOSIS — K21.9: ICD-10-CM

## 2020-06-22 DIAGNOSIS — K59.01: Primary | ICD-10-CM

## 2020-06-22 DIAGNOSIS — Z88.6: ICD-10-CM

## 2020-06-22 DIAGNOSIS — F17.210: ICD-10-CM

## 2020-06-22 DIAGNOSIS — Z91.013: ICD-10-CM

## 2020-06-22 DIAGNOSIS — Z91.041: ICD-10-CM

## 2020-06-22 DIAGNOSIS — F32.9: ICD-10-CM

## 2020-06-22 DIAGNOSIS — Z88.8: ICD-10-CM

## 2020-06-22 PROCEDURE — 96361 HYDRATE IV INFUSION ADD-ON: CPT

## 2020-06-22 PROCEDURE — 99284 EMERGENCY DEPT VISIT MOD MDM: CPT

## 2020-06-22 PROCEDURE — 81001 URINALYSIS AUTO W/SCOPE: CPT

## 2020-06-22 PROCEDURE — 86140 C-REACTIVE PROTEIN: CPT

## 2020-06-22 PROCEDURE — 96375 TX/PRO/DX INJ NEW DRUG ADDON: CPT

## 2020-06-22 PROCEDURE — 80053 COMPREHEN METABOLIC PANEL: CPT

## 2020-06-22 PROCEDURE — 36415 COLL VENOUS BLD VENIPUNCTURE: CPT

## 2020-06-22 PROCEDURE — 96374 THER/PROPH/DIAG INJ IV PUSH: CPT

## 2020-06-22 PROCEDURE — 74176 CT ABD & PELVIS W/O CONTRAST: CPT

## 2020-06-22 PROCEDURE — 85025 COMPLETE CBC W/AUTO DIFF WBC: CPT

## 2020-06-22 NOTE — EDM.PDOC
ED HPI GENERAL MEDICAL PROBLEM





- General


Chief Complaint: Abdominal Pain


Stated Complaint: ABDOMINAL AND BACK PAIN/VOMITING


Time Seen by Provider: 20 06:59


Source of Information: Reports: Patient


History Limitations: Reports: No Limitations





- History of Present Illness


INITIAL COMMENTS - FREE TEXT/NARRATIVE: 





24-year-old female presents to the ED with acute onset of left upper quadrant 

abdominal pain /left flank pain at about 0500 hrs. this morning.  Patient has 

been treated for urinary tract infection with Macrobid starting .  She states the Azo and Macrobid seems to have taken away her dysuria.  She 

has some intermittent fever and chills.  She is afebrile at the time of exam.  

Pain is bad enough that it caused her to vomit this morning.  Emesis was 

bilious.  No blood.  No history of renal colic.  Previous abdominal surgeries 

that of a cholecystectomy.  She states her bowel function clear but she is 

passing hard stools.


Onset: Today


Onset Date: 20


Onset Time: 05:00


Duration: Hour(s):, Colic, Constant, Waxing/Waning


Location: Reports: Abdomen (Left upper quadrant abdominal pain), Back (Flank 

pain)


Quality: Reports: Ache, Sharp (Colicky component to the pain at times.), 

Stabbing


Severity: Moderate


Improves with: Reports: None (8 out of 10)


Worsens with: Reports: None


Context: Denies: Activity, Exercise, Lifting, Sick Contact, Trauma, Other


Associated Symptoms: Reports: Fever/Chills, Loss of Appetite, Nausea/Vomiting 

(Vomited once this morning.).  Denies: Cough, cough w sputum, Malaise, Rash, 

Seizure, Shortness of Breath, Syncope


Treatments PTA: Reports: Other (see below) (None.)


  ** Epigastric


Pain Score (Numeric/FACES): 9





- Related Data


                                    Allergies











Allergy/AdvReac Type Severity Reaction Status Date / Time


 


acetaminophen Allergy Mild Cannot Verified 20 06:36





[From Tylenol-Codeine #3]   Remember  


 


butorphanol [From Stadol] Allergy Mild Cannot Verified 20 06:36





   Remember  


 


codeine Allergy Mild Cannot Verified 20 06:36





[From Tylenol-Codeine #3]   Remember  


 


ketorolac [From Toradol] Allergy Mild Cannot Verified 20 06:36





   Remember  


 


Penicillins Allergy Mild Cannot Verified 06/22/20 06:36





   Remember  


 


shellfish derived Allergy Mild Cannot Verified 20 06:36





   Remember  


 


sulfamethoxazole Allergy Mild Other Verified 20 06:36





[From Bactrim]     


 


trazodone Allergy Mild Cannot Verified 20 06:36





   Remember  


 


trimethoprim [From Bactrim] Allergy Mild Other Verified 20 06:36


 


contrast dye. Allergy Mild Cannot Uncoded 20 06:36





   Remember  











Home Meds: 


                                    Home Meds





Divalproex Sodium [Depakote ER] 250 mg PO TID 20 [History]


Famotidine 40 mg PO DAILY 20 [History]


Acetaminophen/HYDROcodone [Norco 325-5 MG] 1 tab PO Q6H PRN #12 tablet 20 

[Rx]


Orphenadrine [Norflex] 100 mg PO BID PRN #20 tab 20 [Rx]











Past Medical History


HEENT History: Reports: Impaired Vision


Other HEENT History: Wears glasses


Cardiovascular History: Reports: Other (See Below)


Other Cardiovascular History: tachycardia


Respiratory History: Reports: Asthma


Gastrointestinal History: Reports: GERD


Genitourinary History: Reports: Other (See Below)


Other Genitourinary History: stage 2 kidney disease


OB/GYN History: Reports: Pregnancy, Spontaneous 


Musculoskeletal History: Reports: Fracture


Neurological History: Reports: Concussion, Migraines


Psychiatric History: Reports: Anxiety, Bipolar


Endocrine/Metabolic History: Reports: Hyperthyroidism


Other Endocrine/Metabolic History: states has hypoglycemia


Immunologic History: Reports: Other (See Below)


Other Immunologic History: states easily gets colds and cannot tolerate 

cold/viral illnesses.


Dermatologic History: Reports: Psoriasis





- Infectious Disease History


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


Other Infectious Disease History: HPV





- Past Surgical History


HEENT Surgical History: Reports: Adenoidectomy, Myringotomy w Tube(s), 

Tonsillectomy


GI Surgical History: Reports: Cholecystectomy





Social & Family History





- Family History


Family Medical History: Noncontributory





- Tobacco Use


Smoking Status *Q: Current Every Day Smoker


Years of Tobacco use: 12


Packs/Tins Daily: 1





- Caffeine Use


Caffeine Use: Reports: Coffee, Energy Drinks, Soda, Tea





- Alcohol Use


Days Per Week of Alcohol Use: 7


Number of Drinks Per Day: 5


Total Drinks Per Week: 35





- Recreational Drug Use


Recreational Drug Use: No





- Living Situation & Occupation


Living situation: Reports: 


Occupation: Employed





ED ROS GENERAL





- Review of Systems


Review Of Systems: See Below


Constitutional: Reports: Chills, Malaise, Weakness, Fatigue, Decreased Appetite.

  Denies: Weight Loss


HEENT: Reports: No Symptoms


Respiratory: Reports: No Symptoms


Cardiovascular: Reports: No Symptoms


Endocrine: Reports: No Symptoms


GI/Abdominal: Reports: Abdominal Pain, Constipation, Decreased Appetite, Nausea,

 Vomiting.  Denies: Distension, Flatus, Hematemesis, Hematochezia, Melena


: Reports: Dysuria (Improved since starting Macrobid 2 days ago), Frequency.  

Denies: Urgency


Musculoskeletal: Reports: Back Pain (Flank pain)


Skin: Reports: No Symptoms


Neurological: Reports: No Symptoms


Psychiatric: Reports: No Symptoms


Hematologic/Lymphatic: Reports: No Symptoms


Immunologic: Reports: No Symptoms





ED EXAM, GI/ABD





- Physical Exam


Exam: See Below


Exam Limited By: No Limitations


General Appearance: Alert, WD/WN, Mild Distress, Other (Temperature is 36.9 

pulse is 93 and sinus respiratory to 17 /91 pulse ox 100%)


Eyes: Bilateral: Normal Appearance (No scleral icterus or blepharal pallor.)


Throat/Mouth: Other (Tongue is mildly dry and coated.)


Head: Atraumatic, Normocephalic


Neck: No: Normal Inspection, Supple, Non-Tender, Full Range of Motion, 

Lymphadenopathy (L)


Respiratory/Chest: No Respiratory Distress, Lungs Clear, Normal Breath Sounds, 

No Accessory Muscle Use, Chest Non-Tender


Cardiovascular: Normal Peripheral Pulses, Regular Rate, Rhythm, No Edema, No 

Murmur, No Rub


GI/Abdominal Exam: Soft, No Organomegaly, No Abnormal Bruit, No Mass, Tender 

(Tender throughout the left upper abdomen), Abnormal Bowel Sounds (Sounds are 

decreased from the normal.).  No: Guarding (.), Rigid, Rebound


Back Exam: Normal Inspection, Full Range of Motion, CVA Tenderness (L), CVA 

Tenderness (R) (Moderate bilaterally)


Extremities: Normal Inspection, Normal Range of Motion, Non-Tender, No Pedal 

Edema


Neurological: Alert, Oriented, CN II-XII Intact, Normal Cognition


Psychiatric: Normal Affect, Normal Mood


Skin Exam: Warm, Dry, Intact, Normal Color, No Rash





Course





- Vital Signs


Last Recorded V/S: 


                                Last Vital Signs











Temp  36.9 C   20 06:36


 


Pulse  93   20 06:36


 


Resp  17   20 06:36


 


BP  127/91 H  20 06:36


 


Pulse Ox  100   20 06:36














- Orders/Labs/Meds


Orders: 


                               Active Orders 24 hr











 Category Date Time Status


 


 Dextrose 5%-0.9% NaCl [Dextrose 5%-Normal Saline] 1,000 Med  20 07:15 Ac

tive





 ml   





 IV ASDIRECTED   


 


 Dicyclomine [Bentyl] Med  20 09:02 Once





 20 mg PO ONETIME ONE   








                                Medication Orders





Dextrose/Sodium Chloride (Dextrose 5%-Normal Saline)  1,000 mls @ 150 mls/hr IV 

ASDIRECTED VIC


   Last Admin: 20 07:25  Dose: 150 mls/hr


   Documented by: [email or web address]








Labs: 


                                Laboratory Tests











  20 Range/Units





  07: 07:20 08:18 


 


WBC  6.65    (3.98-10.04)  K/mm3


 


RBC  4.73    (3.98-5.22)  M/mm3


 


Hgb  13.4    (11.2-15.7)  gm/dl


 


Hct  41.0    (34.1-44.9)  %


 


MCV  86.7    (79.4-94.8)  fl


 


MCH  28.3    (25.6-32.2)  pg


 


MCHC  32.7    (32.2-35.5)  g/dl


 


RDW Std Deviation  45.5    (36.4-46.3)  fL


 


Plt Count  300    (182-369)  K/mm3


 


MPV  10.0    (9.4-12.3)  fl


 


Neut % (Auto)  61.1    (34.0-71.1)  %


 


Lymph % (Auto)  28.0    (19.3-51.7)  %


 


Mono % (Auto)  8.6    (4.7-12.5)  %


 


Eos % (Auto)  1.5    (0.7-5.8)  


 


Baso % (Auto)  0.6    (0.1-1.2)  %


 


Neut # (Auto)  4.07    (1.56-6.13)  K/mm3


 


Lymph # (Auto)  1.86    (1.18-3.74)  K/mm3


 


Mono # (Auto)  0.57 H    (0.24-0.36)  K/mm3


 


Eos # (Auto)  0.10    (0.04-0.36)  K/mm3


 


Baso # (Auto)  0.04    (0.01-0.08)  K/mm3


 


Sodium   140   (136-145)  mEq/L


 


Potassium   3.9   (3.5-5.1)  mEq/L


 


Chloride   106   ()  mEq/L


 


Carbon Dioxide   27   (21-32)  mEq/L


 


Anion Gap   10.9   (5-15)  


 


BUN   10   (7-18)  mg/dL


 


Creatinine   0.7   (0.55-1.02)  mg/dL


 


Est Cr Clr Drug Dosing   103.40   mL/min


 


Estimated GFR (MDRD)   > 60   (>60)  mL/min


 


BUN/Creatinine Ratio   14.3   (14-18)  


 


Glucose   91   ()  mg/dL


 


Calcium   8.7   (8.5-10.1)  mg/dL


 


Total Bilirubin   0.2   (0.2-1.0)  mg/dL


 


AST   17   (15-37)  U/L


 


ALT   20   (14-59)  U/L


 


Alkaline Phosphatase   77   ()  U/L


 


C-Reactive Protein   <0.2   (<1.0)  mg/dL


 


Total Protein   6.9   (6.4-8.2)  g/dl


 


Albumin   3.5   (3.4-5.0)  g/dl


 


Globulin   3.4   gm/dL


 


Albumin/Globulin Ratio   1.0   (1-2)  


 


Urine Color    Yellow  (Yellow)  


 


Urine Appearance    Clear  (Clear)  


 


Urine pH    7.0  (5.0-8.0)  


 


Ur Specific Gravity    1.020  (1.005-1.030)  


 


Urine Protein    Negative  (Negative)  


 


Urine Glucose (UA)    Negative  (Negative)  


 


Urine Ketones    Negative  (Negative)  


 


Urine Occult Blood    Negative  (Negative)  


 


Urine Nitrite    Negative  (Negative)  


 


Urine Bilirubin    Negative  (Negative)  


 


Urine Urobilinogen    0.2  (0.2-1.0)  


 


Ur Leukocyte Esterase    Negative  (Negative)  


 


Urine RBC    0-5  (0-5)  /hpf


 


Urine WBC    0-5  (0-5)  /hpf


 


Ur Squamous Epith Cells    0-5  (0-5)  /hpf


 


Urine Bacteria    Rare  (FEW)  /hpf


 


Urine Mucus    Not seen  (FEW)  /hpf











Meds: 


Medications











Generic Name Dose Route Start Last Admin





  Trade Name Dax  PRN Reason Stop Dose Admin


 


Dextrose/Sodium Chloride  1,000 mls @ 150 mls/hr  20 07:15  20 07:25





  Dextrose 5%-Normal Saline  IV   150 mls/hr





  ASDIRECTED VIC   Administration














Discontinued Medications














Generic Name Dose Route Start Last Admin





  Trade Name Dax  PRN Reason Stop Dose Admin


 


Hydromorphone HCl  0.5 mg  20 07:05  20 07:26





  Dilaudid  IVPUSH  20 07:06  0.5 mg





  ONETIME ONE   Administration


 


Magnesium Citrate  296 ml  20 08:16  20 08:21





  Citrate Of Magnesia  PO  20 08:17  296 ml





  ONETIME ONE   Administration


 


Metoclopramide HCl  7.5 mg  20 07:06  20 07:25





  Reglan  IVPUSH  20 07:07  7.5 mg





  ONETIME ONE   Administration














- Radiology Interpretation


Free Text/Narrative:: 


Any 4-year-old female presents to the ED with acute onset of left upper quadrant

 left flank pain starting about 5:00 this morning.  Of note she is being treated

 for urinary tract infection for the last 2 days with Macrobid 100 mg twice 

daily.  She reports that her dysuria urgency is improved since starting 

antibiotics and Azo.  She states she is having intermittent fever and chills.  

History of renal colic.  This pain came on rather suddenly and did not cause 

nausea and vomiting.  She has pain in bilateral flanks in the costovertebral 

angles.'s are clear.  Benign benign abdominal examination.  Plan IV D5 normal 

saline at open.  Given Dilaudid 0.5 mg IV for pain relief with Reglan 7.5 mg IV 

for nausea relief.  She will have routine lab work without blood cultures as she

 is afebrile at this time.  CT of the abdomen will be done per renal protocol.








- Re-Assessments/Exams


Free Text/Narrative Re-Assessment/Exam: 





20 08:10 Labs reveal a normal white count of 6.65.  Auto differential 

shows 61% neutrophils.  Hemoglobin is 13.4 with a hematocrit of 41.0.  Platelet 

count 300,000.  Chemistry shows a sodium of 140.  Potassium is 3.9.  Chloride 

106 with a bicarb of 27.  Anion gap is 10.9.  BUN is 10 with a creatinine of 

0.7.  GFR is greater than 60.  Glucose 91 with a calcium of 8.7.  Liver function

 normal C-reactive protein less than 0.2 total protein 6.9 with an albumin 

fraction of 3.5.





20 08:10 CT of the abdomen done per renal protocol reveals previous 

cholecystectomy.  Liver is homogeneous with no intraductal dilatation.  

Gallbladder is absent.  Pancreas appears normal.  Stomach is full of food.  

There is a mild hiatal hernia.  Both kidneys are within normal limits as are the

 adrenal glands.  There was no obstruction of the ureters.  No perinephric 

stranding.  There is a large amount of stool throughout the entire colon 

particularly the transverse colon and splenic flexure where the patient is 

having most of her pain.  The entire descending colon is also full of stool with

 some contrast that she must have had orally recently.  Lab work shows no 

abnormalities.  Pain is being caused by constipation.  Plan she will be given 

magnesium citrate 10 ounces by mouth mixed with 6 ounces of Gatorade or juice of

 choice to provide bowel cleanse.





20 08:59 urinalysis is completely normal.  Patient will be discharged to 

home.





20 09:02 patient is experiencing increased abdominal cramping pain.  We 

will give Bentyl 20 mg by mouth.  She did drink the entire 10 ounces of 

magnesium citrate.  Note will be given for work today as she may miss work as 

she is supposed to attend work at 1400 hrs. today.





20 09:03








Departure





- Departure


Time of Disposition: 09:03


Disposition: Home, Self-Care 01


Condition: Fair


Clinical Impression: 


 Constipation by delayed colonic transit





Abdominal pain


Qualifiers:


 Abdominal location: left upper quadrant Qualified Code(s): R10.12 - Left upper 

quadrant pain








- Discharge Information


*PRESCRIPTION DRUG MONITORING PROGRAM REVIEWED*: Not Applicable


*COPY OF PRESCRIPTION DRUG MONITORING REPORT IN PATIENT DARIUS: Not Applicable


Instructions:  Constipation, Adult


Referrals: 


PCP,None [Primary Care Provider] - 


Forms:  ED Department Discharge, ED Return to Work/School Form


Additional Instructions: 


Evaluation in the emergency room this morning in regards to left upper quadrant 

abdominal pain rating into the left back.  Recently started on Macrobid for 

urinary tract infection 2 days ago.  No fever while in the ED.  Labs proved to 

be completely normal.  No signs of bacterial infection.  CT of the abdomen 

reveals no kidney stones in either kidney and nothing that is obstructing the 

left ureter to cause left upper quadrant pain.  Pain is due to marked 

constipation throughout the entire colon.  Particularly the left upper quadrant 

where you are having your pain.  Treatment is magnesium citrate 10 ounces by 

mouth mixed with 6 ounces of juice of choice to provide bowel cleanse.  Usually 

starts to work 1 to 2 hours after drinking the medication.  Also usually move 3-

5 times often ending in mild diarrhea.  Because the patient is a long-term 

problem suggest MiraLAX, 17 g or 1 scoop daily to prevent constipation from 

occurring.  He knew the Macrobid as prescribed.  Current urinalysis shows it is 

working well with no signs of infection in the urine.





Sepsis Event Note (ED)





- Evaluation


Sepsis Screening Result: No Definite Risk





- Focused Exam


Vital Signs: 


                                   Vital Signs











  Temp Pulse Resp BP Pulse Ox


 


 20 06:36  36.9 C  93  17  127/91 H  100














- My Orders


Last 24 Hours: 


My Active Orders





20 07:15


Dextrose 5%-0.9% NaCl [Dextrose 5%-Normal Saline] 1,000 ml IV ASDIRECTED 





20 09:02


Dicyclomine [Bentyl]   20 mg PO ONETIME ONE 














- Assessment/Plan


Last 24 Hours: 


My Active Orders





20 07:15


Dextrose 5%-0.9% NaCl [Dextrose 5%-Normal Saline] 1,000 ml IV ASDIRECTED 





20 09:02


Dicyclomine [Bentyl]   20 mg PO ONETIME ONE

## 2020-06-22 NOTE — CT
CT abdomen and pelvis

 

Technique: Multiple axial sections were obtained from above the dome 

of the diaphragm inferiorly to the pubic symphysis.  Intravenous and 

oral contrast not utilized.  Study has been performed as a ureteral 

stone protocol.

 

Findings: Kidneys show no abnormal calcifications.  No ureteral 

dilatation is seen.  No ureteral calculi are seen.  No bladder calculi

 are seen.

 

Visualized lung bases showed nothing acute.

 

Liver shows low density next to the ligamentum teres fissure most 

likely due to focal fat.  No additional abnormality is seen within the

 liver.  Spleen appears normal.  Adrenal glands show no nodule.  

Pancreas shows no discrete abnormality.  Aorta shows no aneurysm.  No 

retroperitoneal adenopathy or mesenteric abnormalities are seen.  

Surgical clips are noted from prior cholecystectomy.  No pelvic mass 

or adenopathy is appreciated.  Appendix is seen which is normal in 

size.

 

No free fluid or inflammatory change is seen within the abdomen or 

within the pelvis.  Mild increased stool is noted within the colon.

 

Bone window settings were reviewed which shows no acute osseous 

finding.

 

Impression:

1.  No renal calculi, ureteral dilatation or ureteral stone is seen.

2.  Mild increased stool within the colon.

3.  No additional abnormality is appreciated on noncontrast CT study 

of the abdomen and pelvis.

 

Diagnostic code #2

 

Study was dictated in MDT

## 2020-09-06 NOTE — EDM.PDOC
ED HPI GENERAL MEDICAL PROBLEM





- General


Chief Complaint: ENT Problem


Stated Complaint: SORES IN MOUTH


Time Seen by Provider: 20 12:39


Source of Information: Reports: Patient


History Limitations: Reports: No Limitations





- History of Present Illness


INITIAL COMMENTS - FREE TEXT/NARRATIVE: 





Patient is a 24-year-old female presenting to the emergency department with 

complaints of sores in her mouth.  States she awoke this morning with a number 

of sores on her inner lip, under her tongue, and oropharynx.  She denies a 

history of cold sores or similar lesions, however states she has been sharing 

[place] with an individual.  She has been using Orajel at home for relief.  

Denies any fever or chills.


  ** Oral/Mouth


Pain Score (Numeric/FACES): 7





- Related Data


                                    Allergies











Allergy/AdvReac Type Severity Reaction Status Date / Time


 


butorphanol [From Stadol] Allergy Mild Cannot Verified 20 12:39





   Remember  


 


codeine Allergy Mild Cannot Verified 20 12:39





[From Tylenol-Codeine #3]   Remember  


 


ketorolac [From Toradol] Allergy Mild Cannot Verified 20 12:39





   Remember  


 


Penicillins Allergy Mild Cannot Verified 20 12:39





   Remember  


 


shellfish derived Allergy Mild Cannot Verified 20 12:39





   Remember  


 


sulfamethoxazole Allergy Mild Other Verified 20 12:39





[From Bactrim]     


 


trazodone Allergy Mild Cannot Verified 20 12:39





   Remember  


 


trimethoprim [From Bactrim] Allergy Mild Other Verified 20 12:39


 


contrast dye. Allergy Mild Cannot Uncoded 20 12:39





   Remember  











Home Meds: 


                                    Home Meds





Divalproex Sodium [Depakote ER] 250 mg PO TID 20 [History]


Famotidine 40 mg PO DAILY 20 [History]


Acetaminophen/HYDROcodone [Norco 325-5 MG] 1 tab PO Q6H PRN #12 tablet 20 

[Rx]


Orphenadrine [Norflex] 100 mg PO BID PRN #20 tab 20 [Rx]


Acyclovir 400 mg PO Q8H #29 tablet 20 [Rx]











Past Medical History


HEENT History: Reports: Impaired Vision


Other HEENT History: Wears glasses


Cardiovascular History: Reports: Other (See Below)


Other Cardiovascular History: tachycardia


Respiratory History: Reports: Asthma


Gastrointestinal History: Reports: GERD


Genitourinary History: Reports: Other (See Below)


Other Genitourinary History: stage 2 kidney disease


OB/GYN History: Reports: Pregnancy, Spontaneous 


Musculoskeletal History: Reports: Fracture


Neurological History: Reports: Concussion, Migraines


Psychiatric History: Reports: Anxiety, Bipolar


Endocrine/Metabolic History: Reports: Hyperthyroidism


Other Endocrine/Metabolic History: states has hypoglycemia


Immunologic History: Reports: Other (See Below)


Other Immunologic History: states easily gets colds and cannot tolerate 

cold/viral illnesses.


Dermatologic History: Reports: Psoriasis





- Infectious Disease History


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


Other Infectious Disease History: HPV





- Past Surgical History


HEENT Surgical History: Reports: Adenoidectomy, Myringotomy w Tube(s), 

Tonsillectomy


GI Surgical History: Reports: Cholecystectomy





Social & Family History





- Family History


Family Medical History: Noncontributory





- Tobacco Use


Smoking Status *Q: Current Every Day Smoker


Years of Tobacco use: 11


Packs/Tins Daily: 1.5





- Caffeine Use


Caffeine Use: Reports: Coffee





- Recreational Drug Use


Recreational Drug Use: No





- Living Situation & Occupation


Living situation: Reports: 


Occupation: Employed





ED ROS ENT





- Review of Systems


Review Of Systems: Comprehensive ROS is negative, except as noted in HPI.





ED EXAM, ENT





- Physical Exam


Exam: See Below


Exam Limited By: No Limitations


General Appearance: Alert, WD/WN, No Apparent Distress


Mouth/Throat: Oral Ulcers (Right lower lip, bilateral sides under tongue)


Respiratory/Chest: No Respiratory Distress, Lungs Clear, Normal Breath Sounds, 

No Accessory Muscle Use, Chest Non-Tender


Cardiovascular: Normal Peripheral Pulses, Regular Rate, Rhythm, No Edema, No 

Gallop, No JVD, No Murmur, No Rub


Neurological: Alert, Oriented, CN II-XII Intact, Normal Cognition, Normal Gait, 

Normal Reflexes, No Motor/Sensory Deficits


Psychiatric: Normal Affect, Normal Mood


Skin: Warm, Dry, Intact, Normal Color, No Rash





Course





- Vital Signs


Last Recorded V/S: 





                                Last Vital Signs











Temp  97 F   20 12:37


 


Pulse  107 H  20 12:37


 


Resp  16   20 12:37


 


BP  122/91 H  20 12:37


 


Pulse Ox  98   20 12:37














- Orders/Labs/Meds


Meds: 





Medications














Discontinued Medications














Generic Name Dose Route Start Last Admin





  Trade Name Dax  PRN Reason Stop Dose Admin


 


Acetaminophen  650 mg  20 13:02 





  Tylenol  PO  20 13:03 





  NOW ONE  


 


Acyclovir  400 mg  20 13:03 





  Zovirax  PO  20 13:04 





  ONETIME ONE  














- Re-Assessments/Exams


Free Text/Narrative Re-Assessment/Exam: 





20 13:10


Patient is a 24-year-old female presenting to the emergency department with 

complaints of painful ulcers in her mouth.  On exam, she does have vesicular 

lesions under her tongue as well as one on the right lower lip.  Patient states 

she is allergic to lidocaine, therefore Magic mouthwash would be 

contraindicated.  We will start her on acyclovir and give her a dose of Tylenol.

  Recommend that she continue to use Orajel as needed.  Discharge instructions 

as documented.





Departure





- Departure


Time of Disposition: 13:11


Disposition: Home, Self-Care 01


Condition: Good


Clinical Impression: 


 Oral herpes simplex infection








- Discharge Information


*PRESCRIPTION DRUG MONITORING PROGRAM REVIEWED*: No


*COPY OF PRESCRIPTION DRUG MONITORING REPORT IN PATIENT DARIUS: No


Prescriptions: 


Acyclovir 400 mg PO Q8H #29 tablet


Referrals: 


PCP,None [Primary Care Provider] - 


Additional Instructions: 


You were seen in the emergency department today for painful sores in your mouth.

 On exam, you do have a number of ulcers consistent with a diagnosis of herpes 

simplex type I.  He was started on acyclovir which is an antiviral.  Take this 

medication as prescribed.  You may continue to use the Orajel and 

over-the-counter Tylenol as needed.  Recommend that you avoid any acidic or 

spicy foods and this will likely aggravate your symptoms.  Cold foods are often 

soothing.  If you not see improvement in the lesions after about 3 days, 

recommend that you follow-up in the clinic.  Return to ER as needed.





Sepsis Event Note (ED)





- Evaluation


Sepsis Screening Result: No Definite Risk





- Focused Exam


Vital Signs: 





                                   Vital Signs











  Temp Pulse Resp BP Pulse Ox


 


 20 12:37  97 F  107 H  16  122/91 H  98

## 2020-10-03 NOTE — EDM.PDOC
ED HPI GENERAL MEDICAL PROBLEM





- General


Chief Complaint: Abdominal Pain


Stated Complaint: EXTREMLY CONSTIPATED


Time Seen by Provider: 10/03/20 21:47


Source of Information: Reports: Patient


History Limitations: Reports: No Limitations





- History of Present Illness


INITIAL COMMENTS - FREE TEXT/NARRATIVE: 





This is a 24-year-old female.  She comes tonight because she has not had a bowel

movement for the last week.  She feels pain in her left upper quadrant and flank

area this seems to go into her leg and go up into her shoulder.  She has been 

taking over-the-counter stool softeners and even took some mag citrate but it 

did not provide a bowel movement.  States that she has been urinating semi-

frequently and sometimes large amounts sometimes small amounts.  There is a 

question whether or not she might be pregnant and her test at home have been 

equivocal.  She denies any fever or chills she denies any cough or congestion.  

She is here for the abdominal pain and the constipation.


  ** Left Lower Abdomen


Pain Score (Numeric/FACES): 6





- Related Data


                                    Allergies











Allergy/AdvReac Type Severity Reaction Status Date / Time


 


butorphanol [From Stadol] Allergy Mild Cannot Verified 10/03/20 19:45





   Remember  


 


codeine Allergy Mild Cannot Verified 10/03/20 19:45





[From Tylenol-Codeine #3]   Remember  


 


ketorolac [From Toradol] Allergy Mild Cannot Verified 10/03/20 19:45





   Remember  


 


Penicillins Allergy Mild Cannot Verified 10/03/20 19:45





   Remember  


 


shellfish derived Allergy Mild Cannot Verified 10/03/20 19:45





   Remember  


 


sulfamethoxazole Allergy Mild Other Verified 10/03/20 19:45





[From Bactrim]     


 


trazodone Allergy Mild Cannot Verified 10/03/20 19:45





   Remember  


 


trimethoprim [From Bactrim] Allergy Mild Other Verified 10/03/20 19:45


 


contrast dye. Allergy Mild Cannot Uncoded 20 12:39





   Remember  











Home Meds: 


                                    Home Meds





Divalproex Sodium [Depakote ER] 250 mg PO TID 20 [History]


Famotidine 40 mg PO DAILY 20 [History]


Acetaminophen/HYDROcodone [Norco 325-5 MG] 1 tab PO Q6H PRN #12 tablet 20 

[Rx]


Orphenadrine [Norflex] 100 mg PO BID PRN #20 tab 20 [Rx]


Acyclovir 400 mg PO Q8H #29 tablet 20 [Rx]


Dicyclomine [Bentyl] 20 mg PO Q8H PRN #12 tablet 10/04/20 [Rx]











Past Medical History


HEENT History: Reports: Impaired Vision


Other HEENT History: Wears glasses


Cardiovascular History: Reports: Other (See Below)


Other Cardiovascular History: tachycardia


Respiratory History: Reports: Asthma


Gastrointestinal History: Reports: GERD


Genitourinary History: Reports: Other (See Below)


Other Genitourinary History: stage 2 kidney disease


OB/GYN History: Reports: Pregnancy, Spontaneous 


Musculoskeletal History: Reports: Fracture


Neurological History: Reports: Concussion, Migraines


Psychiatric History: Reports: Anxiety, Bipolar


Endocrine/Metabolic History: Reports: Hyperthyroidism


Other Endocrine/Metabolic History: states has hypoglycemia


Immunologic History: Reports: Other (See Below)


Other Immunologic History: states easily gets colds and cannot tolerate 

cold/viral illnesses.


Dermatologic History: Reports: Psoriasis





- Infectious Disease History


Infectious Disease History: Reports: Herpes


Other Infectious Disease History: HPV





- Past Surgical History


HEENT Surgical History: Reports: Adenoidectomy, Myringotomy w Tube(s), 

Tonsillectomy


GI Surgical History: Reports: Cholecystectomy





Social & Family History





- Family History


Family Medical History: Noncontributory





- Tobacco Use


Smoking Status *Q: Current Every Day Smoker


Years of Tobacco use: 11


Packs/Tins Daily: 2





- Caffeine Use


Caffeine Use: Reports: Coffee, Energy Drinks, Soda, Tea





- Recreational Drug Use


Recreational Drug Use: No





- Living Situation & Occupation


Living situation: Reports: 


Occupation: Employed





ED ROS GENERAL





- Review of Systems


Review Of Systems: See Below


Constitutional: Denies: Fever, Chills


HEENT: Reports: No Symptoms


Respiratory: Denies: Shortness of Breath, Cough


Cardiovascular: Denies: Chest Pain


Endocrine: Reports: No Symptoms


GI/Abdominal: Reports: Abdominal Pain, Constipation.  Denies: Nausea, Vomiting


: Reports: Dysuria, Flank Pain


Musculoskeletal: Reports: No Symptoms


Skin: Reports: No Symptoms


Neurological: Reports: No Symptoms


Psychiatric: Reports: No Symptoms


Hematologic/Lymphatic: Reports: No Symptoms





ED EXAM, GI/ABD





- Physical Exam


Exam: See Below


Exam Limited By: No Limitations


General Appearance: Alert, WD/WN, No Apparent Distress


Eyes: Bilateral: Normal Appearance


Ears: Normal External Exam


Nose: Normal Inspection


Throat/Mouth: Normal Voice, No Airway Compromise


Head: Normocephalic


Neck: Supple


Respiratory/Chest: No Respiratory Distress, Lungs Clear, Normal Breath Sounds


Cardiovascular: Regular Rate, Rhythm, No Murmur


GI/Abdominal Exam: Soft, Other (Planes of tenderness in the left flank and left 

upper quadrant, however she is very soft there is no masses no rebound noted, 

the right abdomen has no tenderness whatsoever.)


Back Exam: Full Range of Motion


Extremities: Normal Inspection, Normal Range of Motion


Neurological: Alert, Oriented


Psychiatric: Normal Affect, Normal Mood


Skin Exam: Warm, Dry





Course





- Vital Signs


Last Recorded V/S: 


                                Last Vital Signs











Temp  97.7 F   10/03/20 19:42


 


Pulse  103 H  10/03/20 19:42


 


Resp  17   10/03/20 19:42


 


BP  133/79   10/03/20 19:42


 


Pulse Ox  100   10/03/20 19:42














- Orders/Labs/Meds


Orders: 


                               Active Orders 24 hr











 Category Date Time Status


 


 Enema [RC] ASDIRECTED Care  10/04/20 01:35 Active


 


 KUB [Abdomen 1V Flat] [CR] Stat Exams  10/04/20 00:06 Taken











Labs: 


                                Laboratory Tests











  10/03/20 10/03/20 10/03/20 Range/Units





  23:10 23:10 23:10 


 


WBC   7.80   (3.98-10.04)  K/mm3


 


RBC   4.88   (3.98-5.22)  M/mm3


 


Hgb   14.2   (11.2-15.7)  gm/dl


 


Hct   42.4   (34.1-44.9)  %


 


MCV   86.9   (79.4-94.8)  fl


 


MCH   29.1   (25.6-32.2)  pg


 


MCHC   33.5   (32.2-35.5)  g/dl


 


RDW Std Deviation   40.5   (36.4-46.3)  fL


 


Plt Count   315   (182-369)  K/mm3


 


MPV   10.1   (9.4-12.3)  fl


 


Neut % (Auto)   69.4   (34.0-71.1)  %


 


Lymph % (Auto)   24.0   (19.3-51.7)  %


 


Mono % (Auto)   5.3   (4.7-12.5)  %


 


Eos % (Auto)   0.8   (0.7-5.8)  


 


Baso % (Auto)   0.4   (0.1-1.2)  %


 


Neut # (Auto)   5.42   (1.56-6.13)  K/mm3


 


Lymph # (Auto)   1.87   (1.18-3.74)  K/mm3


 


Mono # (Auto)   0.41 H   (0.24-0.36)  K/mm3


 


Eos # (Auto)   0.06   (0.04-0.36)  K/mm3


 


Baso # (Auto)   0.03   (0.01-0.08)  K/mm3


 


HCG, Qual    Negative  (NEGATIVE)  


 


Urine Color  Light yellow    (Yellow)  


 


Urine Appearance  Cloudy H    (Clear)  


 


Urine pH  8.5 H    (5.0-8.0)  


 


Ur Specific Gravity  1.020    (1.005-1.030)  


 


Urine Protein  Negative    (Negative)  


 


Urine Glucose (UA)  Negative    (Negative)  


 


Urine Ketones  Negative    (Negative)  


 


Urine Occult Blood  Negative    (Negative)  


 


Urine Nitrite  Negative    (Negative)  


 


Urine Bilirubin  Negative    (Negative)  


 


Urine Urobilinogen  0.2    (0.2-1.0)  


 


Ur Leukocyte Esterase  1+ H    (Negative)  


 


Urine RBC  0-5    (0-5)  /hpf


 


Urine WBC  0-5    (0-5)  /hpf


 


Ur Squamous Epith Cells  10-20 H    (0-5)  /hpf


 


Amorphous Sediment  Many H    (NOT SEEN)  /hpf


 


Urine Bacteria  Few    (FEW)  /hpf


 


Urine Mucus  Few    (FEW)  /hpf











Meds: 


Medications














Discontinued Medications














Generic Name Dose Route Start Last Admin





  Trade Name Freq  PRN Reason Stop Dose Admin


 


Hydrocodone Bitart/Acetaminophen  1 tab  10/03/20 22:57  10/03/20 23:03





  Norco 325-5 Mg  PO  10/03/20 22:58  1 tab





  ONETIME ONE   Administration


 


Ondansetron HCl  4 mg  10/03/20 21:53  10/03/20 21:59





  Zofran Odt  PO  10/03/20 21:54  4 mg





  ONETIME ONE   Administration














- Radiology Interpretation


Free Text/Narrative:: 





UB does suggest considerable stool in the colon but no other acute findings





- Re-Assessments/Exams


Free Text/Narrative Re-Assessment/Exam: 





10/04/20 02:07


Patient did the enema that had good results and she says her pain is resolved 

now and she feels good and wants to go home.  Courage her to continue with the 

stool softeners and lots of fluids.





Departure





- Departure


Time of Disposition: 02:08


Disposition: Home, Self-Care 01


Condition: Good


Clinical Impression: 


 Abdominal cramps





Constipation


Qualifiers:


 Constipation type: unspecified constipation type Qualified Code(s): K59.00 - 

Constipation, unspecified








- Discharge Information


*PRESCRIPTION DRUG MONITORING PROGRAM REVIEWED*: Not Applicable


*COPY OF PRESCRIPTION DRUG MONITORING REPORT IN PATIENT DARIUS: Not Applicable


Prescriptions: 


Dicyclomine [Bentyl] 20 mg PO Q8H PRN #12 tablet


 PRN Reason: Abdominal Pain


Instructions:  Chronic Constipation


Referrals: 


PCP,None [Primary Care Provider] - 


Forms:  ED Department Discharge


Additional Instructions: 


Continue to drink lots of fluids, use the stool softeners, you may do enemas at 

home to help relieve some of the constipation, use the Bentyl as needed for the 

abdominal cramps, follow-up with your doctor later this week for recheck or 

return to the ER if needed





Sepsis Event Note (ED)





- Evaluation


Sepsis Screening Result: No Definite Risk





- Focused Exam


Vital Signs: 


                                   Vital Signs











  Temp Pulse Resp BP Pulse Ox


 


 10/03/20 19:42  97.7 F  103 H  17  133/79  100














- My Orders


Last 24 Hours: 


My Active Orders





10/04/20 00:06


KUB [Abdomen 1V Flat] [CR] Stat 





10/04/20 01:35


Enema [RC] ASDIRECTED 














- Assessment/Plan


Last 24 Hours: 


My Active Orders





10/04/20 00:06


KUB [Abdomen 1V Flat] [CR] Stat 





10/04/20 01:35


Enema [RC] ASDIRECTED

## 2020-10-07 NOTE — EDM.PDOC
ED HPI GENERAL MEDICAL PROBLEM





- General


Chief Complaint: Gastrointestinal Problem


Stated Complaint: STOMACH PAIN


Time Seen by Provider: 10/07/20 19:00


Source of Information: Reports: Patient


History Limitations: Reports: No Limitations





- History of Present Illness


INITIAL COMMENTS - FREE TEXT/NARRATIVE: 





Patient is a 24-year-old female presenting to the emergency department with 

complaints of generalized abdominal discomfort and constipation.  She was seen 

in this emergency department 4 days ago for the same complaint.  While here, she

received an enema which she states did improve her pain, however she has not had

a bowel movement since that time.  She has been using docusate sodium twice 

daily.  States 4 days ago she also drink about two thirds of a bottle of 

magnesium citrate and had no further bowel movements.  She does have a history 

of constipation during pregnancy and as a child.  She is still passing gas.  

Denies any vomiting but states she is nauseous.


  ** Right Abdomen


Pain Score (Numeric/FACES): 7





- Related Data


                                    Allergies











Allergy/AdvReac Type Severity Reaction Status Date / Time


 


butorphanol [From Stadol] Allergy Severe Cannot Verified 10/07/20 18:29





   Remember  


 


codeine Allergy Severe Cannot Verified 10/07/20 18:29





[From Tylenol-Codeine #3]   Remember  


 


ketorolac [From Toradol] Allergy Severe Cannot Verified 10/07/20 18:29





   Remember  


 


Penicillins Allergy Severe Cannot Verified 10/07/20 18:29





   Remember  


 


shellfish derived Allergy Severe Cannot Verified 10/07/20 18:29





   Remember  


 


sulfamethoxazole Allergy Severe Other Verified 10/07/20 18:29





[From Bactrim]     


 


trazodone Allergy Severe Cannot Verified 10/07/20 18:29





   Remember  


 


trimethoprim [From Bactrim] Allergy Severe Other Verified 10/07/20 18:29


 


contrast dye. Allergy Severe Cannot Uncoded 10/07/20 18:29





   Remember  











Home Meds: 


                                    Home Meds





Famotidine 40 mg PO BID 20 [History]


Docusate Sodium [Stool Softener] 100 mg PO DAILY 10/07/20 [History]











Past Medical History


HEENT History: Reports: Impaired Vision


Other HEENT History: Wears glasses


Cardiovascular History: Reports: Other (See Below)


Other Cardiovascular History: tachycardia


Respiratory History: Reports: Asthma


Gastrointestinal History: Reports: GERD


Genitourinary History: Reports: Other (See Below)


Other Genitourinary History: stage 2 kidney disease


OB/GYN History: Reports: Pregnancy, Spontaneous 


Musculoskeletal History: Reports: Fracture


Neurological History: Reports: Concussion, Migraines


Psychiatric History: Reports: Anxiety, Bipolar, Suicidal Ideation


Endocrine/Metabolic History: Reports: Hyperthyroidism


Other Endocrine/Metabolic History: states has hypoglycemia


Immunologic History: Reports: Other (See Below)


Other Immunologic History: states easily gets colds and cannot tolerate 

cold/viral illnesses.


Dermatologic History: Reports: Psoriasis





- Infectious Disease History


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


Other Infectious Disease History: HPV





- Past Surgical History


HEENT Surgical History: Reports: Adenoidectomy, Myringotomy w Tube(s), 

Tonsillectomy


GI Surgical History: Reports: Cholecystectomy





Social & Family History





- Family History


Family Medical History: Noncontributory





- Tobacco Use


Smoking Status *Q: Current Every Day Smoker


Years of Tobacco use: 11


Packs/Tins Daily: 2





- Caffeine Use


Caffeine Use: Reports: Energy Drinks, Soda, Tea





- Recreational Drug Use


Recreational Drug Use: No





- Living Situation & Occupation


Living situation: Reports: 


Occupation: Employed





ED ROS GENERAL





- Review of Systems


Review Of Systems: See Below


Constitutional: Reports: No Symptoms


HEENT: Reports: No Symptoms


Respiratory: Reports: No Symptoms


Cardiovascular: Reports: No Symptoms


Endocrine: Reports: No Symptoms


GI/Abdominal: Reports: Abdominal Pain, Constipation, Distension, Flatus, Nausea.

  Denies: Diarrhea, Decreased Appetite, Vomiting


: Reports: No Symptoms


Musculoskeletal: Reports: No Symptoms


Skin: Reports: No Symptoms


Neurological: Reports: No Symptoms


Psychiatric: Reports: No Symptoms


Hematologic/Lymphatic: Reports: No Symptoms


Immunologic: Reports: No Symptoms





ED EXAM, GI/ABD





- Physical Exam


Exam: See Below


General Appearance: Alert, WD/WN, No Apparent Distress


Respiratory/Chest: No Respiratory Distress, Lungs Clear, Normal Breath Sounds, 

No Accessory Muscle Use, Chest Non-Tender


Cardiovascular: Normal Peripheral Pulses


GI/Abdominal Exam: Normal Bowel Sounds, Soft, No Organomegaly, No Abnormal 

Bruit, No Mass, Pelvis Stable, Distended (mildly), Tender (generalized 

throughout)


Neurological: Alert, Oriented, CN II-XII Intact, Normal Cognition, Normal Gait, 

Normal Reflexes, No Motor/Sensory Deficits


Psychiatric: Normal Affect, Normal Mood


Skin Exam: Warm, Dry, Intact, Normal Color, No Rash





Course





- Vital Signs


Last Recorded V/S: 


                                Last Vital Signs











Temp  98.6 F   10/07/20 18:25


 


Pulse  98   10/07/20 18:25


 


Resp  16   10/07/20 18:25


 


BP  124/93 H  10/07/20 18:25


 


Pulse Ox  100   10/07/20 18:25














- Orders/Labs/Meds


Orders: 


                               Active Orders 24 hr











 Category Date Time Status


 


 Abdomen 2V AP Flat Upright [CR] Stat Exams  10/07/20 19:03 Taken











Meds: 


Medications














Discontinued Medications














Generic Name Dose Route Start Last Admin





  Trade Name Dax  PRN Reason Stop Dose Admin


 


Dicyclomine HCl  20 mg  10/07/20 19:12  10/07/20 19:24





  Bentyl  PO  10/07/20 19:13  20 mg





  ONETIME ONE   Administration


 


Ondansetron HCl  4 mg  10/07/20 19:13  10/07/20 19:23





  Zofran Odt  PO  10/07/20 19:14  4 mg





  ONETIME ONE   Administration














- Re-Assessments/Exams


Free Text/Narrative Re-Assessment/Exam: 





10/07/20 19:44


X-ray of the abdomen flat and upright shows increased stool throughout the 

descending colon and splenic flexure, as well as increased air throughout the 

small bowel.  There is no air-fluid levels.  Patient has been passing gas well. 

 Enema was offered, however patient opted to try the bottle of magnesium citrate

 prior to this.  We will send her home with a bottle of mag citrate.  Recommend 

that she start a bowel regimen of MiraLAX twice daily to try to maintain 

regularity.  Return to the ER as needed.  Discharge instructions as documented.





Departure





- Departure


Time of Disposition: 19:45


Disposition: Home, Self-Care 01


Condition: Good


Clinical Impression: 


Constipation


Qualifiers:


 Constipation type: unspecified constipation type Qualified Code(s): K59.00 - 

Constipation, unspecified








- Discharge Information


*PRESCRIPTION DRUG MONITORING PROGRAM REVIEWED*: No


*COPY OF PRESCRIPTION DRUG MONITORING REPORT IN PATIENT DARIUS: No


Instructions:  Constipation, Adult


Referrals: 


PCP,None [Primary Care Provider] - 


Forms:  ED Department Discharge


Additional Instructions: 


You were seen in the emergency department today for continued constipation with 

abdominal distention and pain.  X-rays were completed and do show a collection 

of stool throughout the left side of your colon as well as air collection 

throughout your small bowel.  You have been sent home with a bottle of magnesium

citrate.  Recommend that you drink this entire bottle upon getting home.  This 

should produce a number of bowel movements, some of which may be loose.  If this

fails to relieve your discomfort or you develop any new or worsening symptoms of

concern, please not hesitate to return to the emergency department.





Sepsis Event Note (ED)





- Evaluation


Sepsis Screening Result: No Definite Risk





- Focused Exam


Vital Signs: 


                                   Vital Signs











  Temp Pulse Resp BP Pulse Ox


 


 10/07/20 18:25  98.6 F  98  16  124/93 H  100














- My Orders


Last 24 Hours: 


My Active Orders





10/07/20 19:03


Abdomen 2V AP Flat Upright [CR] Stat 














- Assessment/Plan


Last 24 Hours: 


My Active Orders





10/07/20 19:03


Abdomen 2V AP Flat Upright [CR] Stat

## 2020-11-04 NOTE — CR
PROCEDURE INFORMATION:

Exam: XR Abdomen, 1 View

Exam date and time: 10/4/2020 12:38 AM

Age: 24 years old

Clinical indication: Abdominal pain; Flank; Left upper quadrant (luq)



TECHNIQUE:

Imaging protocol: XR of the abdomen.

Views: Frontal supine view of the abdomen. 1 View.



COMPARISON:

CT Abdomen Pelvis wo Cont 6/22/2020 7:54 AM



FINDINGS:

Gastrointestinal tract: Normal. No bowel dilation. There is a moderate amount of
colonic content

identified.

Intraperitoneal space: Normal. No free air.

Organs: Patient is status post cholecystectomy.

Bones/joints: Unremarkable for age.

Soft tissues: No suspect mass or calcification.



IMPRESSION:

No acute abnormality findings.



Thank you for allowing us to participate in the care of your patient.



Dictated and Authenticated by: Paulino Reddy MD

11/03/2020 7:09 PM Central Time ([place] & [place])

[email or web address]